# Patient Record
Sex: MALE | Race: WHITE | NOT HISPANIC OR LATINO | Employment: OTHER | URBAN - METROPOLITAN AREA
[De-identification: names, ages, dates, MRNs, and addresses within clinical notes are randomized per-mention and may not be internally consistent; named-entity substitution may affect disease eponyms.]

---

## 2017-01-06 ENCOUNTER — VIRTUAL VISIT (OUTPATIENT)
Dept: FAMILY MEDICINE | Facility: CLINIC | Age: 52
End: 2017-01-06
Payer: COMMERCIAL

## 2017-01-06 DIAGNOSIS — F51.05 INSOMNIA DUE TO OTHER MENTAL DISORDER: ICD-10-CM

## 2017-01-06 DIAGNOSIS — F99 INSOMNIA DUE TO OTHER MENTAL DISORDER: ICD-10-CM

## 2017-01-06 DIAGNOSIS — E34.9 HYPOTESTOSTERONISM: ICD-10-CM

## 2017-01-06 DIAGNOSIS — F33.2 SEVERE EPISODE OF RECURRENT MAJOR DEPRESSIVE DISORDER, WITHOUT PSYCHOTIC FEATURES (H): Primary | ICD-10-CM

## 2017-01-06 DIAGNOSIS — E55.9 HYPOVITAMINOSIS D: ICD-10-CM

## 2017-01-06 PROCEDURE — 99442 ZZC PHYSICIAN TELEPHONE EVALUATION 11-20 MIN: CPT | Performed by: FAMILY MEDICINE

## 2017-01-06 RX ORDER — TRAZODONE HYDROCHLORIDE 50 MG/1
50 TABLET, FILM COATED ORAL
Qty: 30 TABLET | Refills: 1 | Status: SHIPPED | OUTPATIENT
Start: 2017-01-06 | End: 2017-03-10

## 2017-01-06 RX ORDER — BUPROPION HYDROCHLORIDE 150 MG/1
150 TABLET ORAL EVERY MORNING
Qty: 90 TABLET | Refills: 3 | Status: SHIPPED | OUTPATIENT
Start: 2017-01-06 | End: 2017-02-10

## 2017-01-06 RX ORDER — CITALOPRAM HYDROBROMIDE 20 MG/1
20 TABLET ORAL 2 TIMES DAILY
Qty: 60 TABLET | Refills: 3 | Status: SHIPPED | OUTPATIENT
Start: 2017-01-06 | End: 2017-02-10

## 2017-01-06 NOTE — PROGRESS NOTES
Additional history/life update:       Severe episode of recurrent major depressive disorder, without psychotic features (H)  Insomnia due to other mental disorder  Hypotestosteronism  Hypovitaminosis D :    Mood slightly better, not worse.  Severe but not suicidal. titrating wellbutrin up slowly, had to start at 1/4 tab due to insomnia with it.  Up to one full tab now for only three days no side effects now.  Wondering about having trazodone refilled though in case gets insomnia.     Wondering about vitamin D dose.  Letter confusing.     Didn't get testosterone checked, took last dose 12/20/16.  Needs citalopram refilled as well.     Medical, social, surgical, and family histories reviewed.      REVIEW OF SYSTEMS FOR GENERAL, RESPIRATORY, CV, GI AND PSYCHIATRIC NEGATIVE EXCEPT AS NOTED IN HPI.         OBJECTIVE:  There were no vitals taken for this visit.    EXAM:  GENERAL APPEARANCE: healthy, alert and no distress      ASSESSMENT AND PLAN  1. Severe episode of recurrent major depressive disorder, without psychotic features (H)  Continue 150mg wellbutrin xl and citalopram 20 twice daily.  Likely will not increase dose further.     I suspect testosterone will be low and we will be increasing that dose which should help mood and energy.       2. Insomnia due to other mental disorder  Refilled trazodone to use as needed.   - traZODone (DESYREL) 50 MG tablet; Take 1 tablet (50 mg) by mouth nightly as needed for sleep  Dispense: 30 tablet; Refill: 1    3. Hypotestosteronism  As above.  Check level on 1/9/17.   - Testosterone Free and Total; Future    4. Hypovitaminosis D  Will use 3, 000 units daily.   - cholecalciferol (VITAMIN D) 1000 UNIT tablet; Take 3 tablets (3,000 Units) by mouth daily Re-check level April 2017  Dispense: 90 tablet; Refill: 1      Started phone visit at 11:16 and ended at 11:35.  Total time 19 minutes.     Joel Wegener,MD

## 2017-01-09 ENCOUNTER — TELEPHONE (OUTPATIENT)
Dept: FAMILY MEDICINE | Facility: CLINIC | Age: 52
End: 2017-01-09

## 2017-01-09 DIAGNOSIS — E34.9 HYPOTESTOSTERONISM: ICD-10-CM

## 2017-01-09 PROCEDURE — 36415 COLL VENOUS BLD VENIPUNCTURE: CPT | Performed by: FAMILY MEDICINE

## 2017-01-09 PROCEDURE — 84403 ASSAY OF TOTAL TESTOSTERONE: CPT | Mod: 90 | Performed by: FAMILY MEDICINE

## 2017-01-09 PROCEDURE — 99000 SPECIMEN HANDLING OFFICE-LAB: CPT | Performed by: FAMILY MEDICINE

## 2017-01-09 PROCEDURE — 84270 ASSAY OF SEX HORMONE GLOBUL: CPT | Mod: 90 | Performed by: FAMILY MEDICINE

## 2017-01-11 LAB
SHBG SERPL-SCNC: 34 NMOL/L (ref 11–80)
TESTOST FREE SERPL-MCNC: 3.16 NG/DL (ref 4.7–24.4)
TESTOST SERPL-MCNC: 171 NG/DL (ref 240–950)

## 2017-01-12 ENCOUNTER — TELEPHONE (OUTPATIENT)
Dept: FAMILY MEDICINE | Facility: CLINIC | Age: 52
End: 2017-01-12

## 2017-01-12 DIAGNOSIS — E34.9 HYPOTESTOSTERONISM: Primary | ICD-10-CM

## 2017-01-12 NOTE — TELEPHONE ENCOUNTER
Luis,     I'll have my team call you tomorrow about adjusting your testosterone dose since it is still quite low.  I think this will help a lot!     Joel Wegener,MD

## 2017-01-13 RX ORDER — TESTOSTERONE CYPIONATE 200 MG/ML
200 INJECTION, SOLUTION INTRAMUSCULAR
Qty: 3 ML | Refills: 5 | Status: SHIPPED | OUTPATIENT
Start: 2017-01-13 | End: 2017-08-01

## 2017-01-13 NOTE — TELEPHONE ENCOUNTER
Please call pt.  Testosterone level fairly low.      REcommend:    Increase frequency of dose to every 14 days (from every two weeks) = 200mg every two weeks.     Re-check testosterone level 1/2 way between doses in about six weeks - I put in future lab order.     Please bring new prescription in nurse basket to  for him to  and bring to his pharmacy.     Joel Wegener,MD

## 2017-02-06 ENCOUNTER — TELEPHONE (OUTPATIENT)
Dept: FAMILY MEDICINE | Facility: CLINIC | Age: 52
End: 2017-02-06

## 2017-02-06 NOTE — TELEPHONE ENCOUNTER
Reason for Call:  Other call back    Detailed comments: Patients' wife Maricruz called in regards to two medications that her spouse is taking. Didn't mention which ones but states that one of the medications keeps him up at night so she assumed that was the traZODone; the other medication changes him - i.e his sexual behavior and she was wondering if there were any alternatives that he could take compared to what he is already taking.    Phone Number Patient can be reached at: Home number on file [phone] (home)    Best Time: anytime    Can we leave a detailed message on this number? YES    Call taken on 2/6/2017 at 1:18 PM by Hayden Johnson

## 2017-02-07 NOTE — TELEPHONE ENCOUNTER
I don't believe there is any limit on visits for insurance, it wouldn't be covered as a preventive physical however.      Yes, telephone visit is fine.  Schedule in my schedule in green slot or if they feel is urgent same day hold is ok too.     Joel Wegener,MD

## 2017-02-07 NOTE — TELEPHONE ENCOUNTER
Called and spoke with pt wife, regarding pt coming in for a office visit to follow up and discuss meds per Dr. Wegener. Pt wife would like to know if they can schedule a phone visit instead of an OV as she doesn't think insurance will cover another visit.    Lizette Banks MA

## 2017-02-07 NOTE — TELEPHONE ENCOUNTER
"Spoke to wife with patiet in the background answering questons as well:  Patient started Bupropion but it is keeping him up at night even though it says it can cause drowsiness.  Citalopram gives him \"real bad personal, sexual problems\"    Not sure why he is on both meds at the same time  Had tried citalopram in the past and had sexual problems then as well.  Please advise.  Jenny Gamboa RN  "

## 2017-02-07 NOTE — TELEPHONE ENCOUNTER
Pt spouse calling to request a return call asap, as they have other medications to  at the pharmacy today.    Annita Thomas  Patient Representative

## 2017-02-08 NOTE — TELEPHONE ENCOUNTER
Received return call from Maricruz - wife - consent to communicate on file - discussed provider response    Verbalized understanding - no further questions at this time    Closing encounter - no further actions needed at this time    Jay Sanchez RN

## 2017-02-08 NOTE — TELEPHONE ENCOUNTER
Called and left message for patient to call back. Regarding the note from Wegener.   Thanks.    Loreta Garcia MA

## 2017-02-08 NOTE — TELEPHONE ENCOUNTER
Dr Wegener,     OK to just stop Bupropion if he is out or should he get refill until his conversation with you Friday    Liane Shearer, RN, BSN

## 2017-02-08 NOTE — TELEPHONE ENCOUNTER
I called patient and schedule telephone visit for Friday however, patient wife is asking if he should refill the Bupropion?    Please advise

## 2017-02-10 ENCOUNTER — VIRTUAL VISIT (OUTPATIENT)
Dept: FAMILY MEDICINE | Facility: CLINIC | Age: 52
End: 2017-02-10
Payer: COMMERCIAL

## 2017-02-10 DIAGNOSIS — F33.0 MAJOR DEPRESSIVE DISORDER, RECURRENT EPISODE, MILD (H): Primary | ICD-10-CM

## 2017-02-10 DIAGNOSIS — E34.9 HYPOTESTOSTERONISM: ICD-10-CM

## 2017-02-10 PROCEDURE — 99442 ZZC PHYSICIAN TELEPHONE EVALUATION 11-20 MIN: CPT | Performed by: FAMILY MEDICINE

## 2017-02-10 RX ORDER — ESCITALOPRAM OXALATE 20 MG/1
20 TABLET ORAL DAILY
Qty: 90 TABLET | Refills: 1 | Status: SHIPPED | OUTPATIENT
Start: 2017-02-10 | End: 2017-07-31

## 2017-02-10 ASSESSMENT — ANXIETY QUESTIONNAIRES
IF YOU CHECKED OFF ANY PROBLEMS ON THIS QUESTIONNAIRE, HOW DIFFICULT HAVE THESE PROBLEMS MADE IT FOR YOU TO DO YOUR WORK, TAKE CARE OF THINGS AT HOME, OR GET ALONG WITH OTHER PEOPLE: SOMEWHAT DIFFICULT
5. BEING SO RESTLESS THAT IT IS HARD TO SIT STILL: NOT AT ALL
2. NOT BEING ABLE TO STOP OR CONTROL WORRYING: NOT AT ALL
1. FEELING NERVOUS, ANXIOUS, OR ON EDGE: NOT AT ALL
6. BECOMING EASILY ANNOYED OR IRRITABLE: SEVERAL DAYS
3. WORRYING TOO MUCH ABOUT DIFFERENT THINGS: NOT AT ALL

## 2017-02-10 ASSESSMENT — PATIENT HEALTH QUESTIONNAIRE - PHQ9: 5. POOR APPETITE OR OVEREATING: NOT AT ALL

## 2017-02-10 NOTE — PROGRESS NOTES
"Luis Diehl is a 51 year old male who is being evaluated via a telephone visit.      The patient has been notified of following:     \"This telephone visit will be conducted via a call between you and your physician/provider. We have found that certain health care needs can be provided without the need for a physical exam.  This service lets us provide the care you need with a short phone conversation.  If a prescription is necessary we can send it directly to your pharmacy.  If lab work is needed we can place an order for that and you can then stop by our lab to have the test done at a later time.    We will bill your insurance company for this service.  Please check with your medical insurance if this type of visit is covered. You may be responsible for the cost of this type of visit if insurance coverage is denied.  The typical cost is $30 (10min), $59 (11-20min) and $85 (21-30min).  Most often these visits are shorter than 10 minutes.    If during the course of the call the physician/provider feels a telephone visit is not appropriate, you will not be charged for this service.\"       Consent has been obtained for this service by 2 care team members: yes. See the scanned image in the medical record.    Luis Diehl complains of  Telephone      I have reviewed and updated the patient's Past Medical History, Social History, Family History and Medication List.    ALLERGIES  Review of patient's allergies indicates no known allergies.    Loreta Garcia MA   (MA signature)    Additional history/life update:       Major depressive disorder, recurrent episode, mild (H)  Hypotestosteronism :does feel mood slightly/slowly improving.   No longer hopless.  Has more motivation.      Medications not going great though.  Still significant ED which he attributes to the celexa.  Same problem with effexor in past.  Perhaps improved some with testosterone replacement.      wellbutrin he attributes insomnia and " sometimes lack of appetite to that so really wants to stop it.     Medical, social, surgical, and family histories reviewed.      REVIEW OF SYSTEMS FOR GENERAL, RESPIRATORY, CV, GI AND PSYCHIATRIC NEGATIVE EXCEPT AS NOTED IN HPI.         OBJECTIVE:  There were no vitals taken for this visit.    EXAM:  GENERAL APPEARANCE: healthy, alert and no distress      ASSESSMENT AND PLAN  1. Major depressive disorder, recurrent episode, mild (H)  Stop wellbutrin.     Stop celexa.      Start lexapro as below.       - escitalopram (LEXAPRO) 20 MG tablet; Take 1 tablet (20 mg) by mouth daily  Dispense: 90 tablet; Refill: 1    2. Hypotestosteronism  Check lab 1/2 way between injections in mid march and we can adjust dose as needed.     I recommended follow up in one month and in 5 months.  He wants to wait to schedule due to cost.      Definitely let me know if not continuing to improve.       Started phone visit at 12:01, ended at 12:15        Total time of call between patient and provider was 14 minutes     Joel Wegener,MD

## 2017-02-11 ASSESSMENT — PATIENT HEALTH QUESTIONNAIRE - PHQ9: SUM OF ALL RESPONSES TO PHQ QUESTIONS 1-9: 5

## 2017-02-14 DIAGNOSIS — E78.5 HYPERLIPIDEMIA LDL GOAL <130: Primary | ICD-10-CM

## 2017-02-14 NOTE — TELEPHONE ENCOUNTER
Received refill request from [facility].   Pt want refills for atorvastatin (LIPITOR) 40 MG tablet.      Rosario Ochoa MA

## 2017-02-14 NOTE — TELEPHONE ENCOUNTER
Sarah with Dr. Wegener and he would like Luis to call his insurance to see what other statins are covered by his insurance.   Called pt and left vm to call back .     Please let pt know to call his insurance and find out what statins are covered under his insurance when he calls back    Thanks!    Barbara West, CMA

## 2017-02-15 DIAGNOSIS — E78.5 HYPERLIPIDEMIA LDL GOAL <130: ICD-10-CM

## 2017-02-15 RX ORDER — ATORVASTATIN CALCIUM 40 MG/1
40 TABLET, FILM COATED ORAL DAILY
Qty: 90 TABLET | Refills: 1 | Status: CANCELLED | OUTPATIENT
Start: 2017-02-15

## 2017-02-15 RX ORDER — ATORVASTATIN CALCIUM 40 MG/1
40 TABLET, FILM COATED ORAL DAILY
Qty: 90 TABLET | Refills: 2 | Status: SHIPPED | OUTPATIENT
Start: 2017-02-15 | End: 2017-11-01

## 2017-02-15 NOTE — TELEPHONE ENCOUNTER
Patient's wife, Maricruz, called and spoke with writer.    Consent to communicate on file.    Per Maricruz:  1. Patient's insurance does cover Atorvastatin 40 mg, please send refill today    Routing to PCP.    Dr. Wegener-Please sign if agree.  Writer unsure if there was a prior auth request.  Writer does not find one in patient's chart.    Thank you!  YOLANDA Huber, RN           Lab Results   Component Value Date    CHOL 150 12/13/2016     Lab Results   Component Value Date    HDL 41 12/13/2016     Lab Results   Component Value Date    LDL 73 12/13/2016     Lab Results   Component Value Date    TRIG 182 12/13/2016     No results found for: CHOLHDLRATIO

## 2017-02-15 NOTE — TELEPHONE ENCOUNTER
Maricruz (wife) is calling from pharmacy.  She is saying that a nurse here told her that this order would be sent in right away and so she is waiting at the pharmacy.  I sent a lync to Dr. Wegener asking him to take a look at this as soon as he can.  Then I reviewed and it looks like RN can authorize.  I called Maricruz and told her order sent.    Prescription approved per [facility] Refill Protocol.  Jeanna Bazan RN      Last office visit : 12/13/16     Lab Results   Component Value Date    CHOL 150 12/13/2016     Lab Results   Component Value Date    HDL 41 12/13/2016     Lab Results   Component Value Date    LDL 73 12/13/2016     Lab Results   Component Value Date    TRIG 182 12/13/2016     No results found for: CHOLHDLRATIO

## 2017-02-15 NOTE — TELEPHONE ENCOUNTER
atorvastatin (LIPITOR) 40 MG tablet     Last Written Prescription Date: 7/31/16  Last Fill Quantity: 90, # refills: 1  Last Office Visit with [facility], [facility] or [facility] prescribing provider: 12/13/16       Lab Results   Component Value Date    CHOL 150 12/13/2016     Lab Results   Component Value Date    HDL 41 12/13/2016     Lab Results   Component Value Date    LDL 73 12/13/2016     Lab Results   Component Value Date    TRIG 182 12/13/2016     No results found for: CHOLHDLRATIO

## 2017-03-10 DIAGNOSIS — F51.05 INSOMNIA DUE TO OTHER MENTAL DISORDER: ICD-10-CM

## 2017-03-10 DIAGNOSIS — F99 INSOMNIA DUE TO OTHER MENTAL DISORDER: ICD-10-CM

## 2017-03-10 RX ORDER — TRAZODONE HYDROCHLORIDE 50 MG/1
TABLET, FILM COATED ORAL
Qty: 30 TABLET | Refills: 0 | Status: SHIPPED | OUTPATIENT
Start: 2017-03-10 | End: 2017-04-03

## 2017-04-03 DIAGNOSIS — F51.05 INSOMNIA DUE TO OTHER MENTAL DISORDER: ICD-10-CM

## 2017-04-03 DIAGNOSIS — F99 INSOMNIA DUE TO OTHER MENTAL DISORDER: ICD-10-CM

## 2017-04-04 NOTE — TELEPHONE ENCOUNTER
Medication Detail      Disp Refills Start End SHILPA   traZODone (DESYREL) 50 MG tablet 30 tablet 0 3/10/2017  No   Sig: TAKE ONE TABLET BY MOUTH ONCE DAILY AT  NIGHT  AS  NEEDED  FOR  SLEEP       Last Office Visit with [facility], [facility] or [facility] prescribing provider:  12/13/16        Last PHQ-9 score on record=   PHQ-9 SCORE 2/10/2017   Total Score 5       Lab Results   Component Value Date    AST 12 12/13/2016     Lab Results   Component Value Date    ALT 30 12/13/2016

## 2017-04-05 RX ORDER — TRAZODONE HYDROCHLORIDE 50 MG/1
TABLET, FILM COATED ORAL
Qty: 90 TABLET | Refills: 1 | Status: SHIPPED | OUTPATIENT
Start: 2017-04-05 | End: 2017-10-02

## 2017-04-06 NOTE — TELEPHONE ENCOUNTER
Prescription approved per [facility] Refill Protocol.    Signed Prescriptions:                        Disp   Refills    traZODone (DESYREL) 50 MG tablet           90 tab*1        Sig: TAKE ONE TABLET BY MOUTH ONCE DAILY AT NIGHT AS           NEEDED FOR SLEEP  Authorizing Provider: WEGENER, JOEL DANIEL IRWIN  Ordering User: LEIGH ANN BUCKLEY      Closing encounter - no further actions needed at this time    Leigh Ann Buckley RN

## 2017-04-26 ENCOUNTER — TELEPHONE (OUTPATIENT)
Dept: FAMILY MEDICINE | Facility: CLINIC | Age: 52
End: 2017-04-26

## 2017-04-26 NOTE — TELEPHONE ENCOUNTER
I have attempted to contact this patient by phone and left message to return my call on answering machine.    Please let Luis Martha Diehl know that his/ her provider would like to see how they are doing and would like to complete a PHQ9 over the phone.     Trang Ochoa MA

## 2017-04-26 NOTE — LETTER
[facility]   [address] [place] [address]   [id number]  [phone]    May 12, 2017      Luis Thomas Fazal  [address] [place] [address]              Dear Mr. Diehl,      I hope you are doing well. Your provider wanted me to check in with you to see how you are doing with depression. Please fill out the attached questionnaire(s) and send it back to us within 3-4 days so we can assess the status of your health.     If you do not wish to do the questionnaire, please give us a called we can do this over the phone or schedule a visit with your provider. Our phone number is [phone].         Sincerely,      Your [facility] Care Team

## 2017-05-23 DIAGNOSIS — E55.9 HYPOVITAMINOSIS D: ICD-10-CM

## 2017-05-23 DIAGNOSIS — N20.0 NEPHROLITHIASIS: ICD-10-CM

## 2017-05-23 NOTE — TELEPHONE ENCOUNTER
Medication Detail      Disp Refills Start End SHILPA   chlorthalidone (HYGROTON) 25 MG tablet 45 tablet 3 5/23/2016  --   Sig: Take 0.5 tablets (12.5 mg) by mouth daily       Last Office Visit with [facility], [facility] or [facility] prescribing provider: 12/13/16       Potassium   Date Value Ref Range Status   12/13/2016 3.8 3.4 - 5.3 mmol/L Final     Creatinine   Date Value Ref Range Status   12/13/2016 0.92 0.66 - 1.25 mg/dL Final     BP Readings from Last 3 Encounters:   12/13/16 136/66   01/26/16 130/62     Medication Detail      Disp Refills Start End SHILPA   cholecalciferol (VITAMIN D) 1000 UNIT tablet 90 tablet 1 1/6/2017  --   Sig: Take 3 tablets (3,000 Units) by mouth daily Re-check level April 2017        Last Office Visit with [facility], [facility] or [facility] prescribing provider: 12/13/16

## 2017-05-24 RX ORDER — CHLORTHALIDONE 25 MG/1
12.5 TABLET ORAL DAILY
Qty: 45 TABLET | Refills: 1 | Status: SHIPPED | OUTPATIENT
Start: 2017-05-24 | End: 2017-12-07

## 2017-05-24 NOTE — TELEPHONE ENCOUNTER
"Routing refill request to provider for review/approval because:  --Plan in last order for Vit D 1/6/17 was \"Re-check level April 2017\".    Component      Latest Ref Rng & Units 12/13/2016   Vitamin D Deficiency screening      20 - 75 ug/L 17 (L)       --  --Please call patient  and ask him to schedule a lab only for vit D check.  A refill request for below medication  was sent to the provider.     Thank you,  Jeanna Bazan RN        "

## 2017-05-25 NOTE — TELEPHONE ENCOUNTER
Lm on vm letting patient know provider wants him to have labs done for further refills of his Vit D meds

## 2017-05-26 ASSESSMENT — ANXIETY QUESTIONNAIRES
IF YOU CHECKED OFF ANY PROBLEMS ON THIS QUESTIONNAIRE, HOW DIFFICULT HAVE THESE PROBLEMS MADE IT FOR YOU TO DO YOUR WORK, TAKE CARE OF THINGS AT HOME, OR GET ALONG WITH OTHER PEOPLE: SOMEWHAT DIFFICULT
2. NOT BEING ABLE TO STOP OR CONTROL WORRYING: SEVERAL DAYS
7. FEELING AFRAID AS IF SOMETHING AWFUL MIGHT HAPPEN: SEVERAL DAYS
6. BECOMING EASILY ANNOYED OR IRRITABLE: SEVERAL DAYS
GAD7 TOTAL SCORE: 6
5. BEING SO RESTLESS THAT IT IS HARD TO SIT STILL: NOT AT ALL
3. WORRYING TOO MUCH ABOUT DIFFERENT THINGS: SEVERAL DAYS
1. FEELING NERVOUS, ANXIOUS, OR ON EDGE: SEVERAL DAYS

## 2017-05-26 ASSESSMENT — PATIENT HEALTH QUESTIONNAIRE - PHQ9: 5. POOR APPETITE OR OVEREATING: SEVERAL DAYS

## 2017-05-27 ASSESSMENT — ANXIETY QUESTIONNAIRES: GAD7 TOTAL SCORE: 6

## 2017-05-27 ASSESSMENT — PATIENT HEALTH QUESTIONNAIRE - PHQ9: SUM OF ALL RESPONSES TO PHQ QUESTIONS 1-9: 3

## 2017-06-28 NOTE — TELEPHONE ENCOUNTER
Patient has lab appt on Thursday 6/29, no orders in chart. Please place order for test below that patient was told to come in for.    Thanks,   lab

## 2017-06-29 DIAGNOSIS — E34.9 HYPOTESTOSTERONISM: Primary | ICD-10-CM

## 2017-06-29 DIAGNOSIS — E55.9 HYPOVITAMINOSIS D: ICD-10-CM

## 2017-06-29 PROCEDURE — 84403 ASSAY OF TOTAL TESTOSTERONE: CPT | Performed by: FAMILY MEDICINE

## 2017-06-29 PROCEDURE — 36415 COLL VENOUS BLD VENIPUNCTURE: CPT | Performed by: FAMILY MEDICINE

## 2017-06-29 PROCEDURE — 84270 ASSAY OF SEX HORMONE GLOBUL: CPT | Performed by: FAMILY MEDICINE

## 2017-06-29 PROCEDURE — 82306 VITAMIN D 25 HYDROXY: CPT | Performed by: FAMILY MEDICINE

## 2017-06-29 NOTE — LETTER
[facility]   [address] [place]   [address]  [phone]    July 6, 2017      Luis Diehl  [address] [place] [address]              Dear Mr. Diehl,    Your testosterone level came back normal indicating you are on the correct dose of testosterone therapy.      Results for orders placed or performed in visit on 06/29/17   Vitamin D Deficiency   Result Value Ref Range    Vitamin D Deficiency screening 53 20 - 75 ug/L   **Testosterone Free and Total FUTURE anytime   Result Value Ref Range    Testosterone Total 325 240 - 950 ng/dL    Sex Hormone Binding Globulin 29 11 - 80 nmol/L    Free Testosterone Calculated 6.94 4.7 - 24.4 ng/dL         Sincerely,    Pam Sow, YVON/nr  (covering for Dr. Wegener who is currently out of the office)

## 2017-06-29 NOTE — LETTER
[facility]   [address] [place]   [address]  [phone]    July 5, 2017      Luis Thomas Fazal  [address] [place] [address]              Dear Mr. Diehl,    Your vitamin D test was normal.    Results for orders placed or performed in visit on 06/29/17   Vitamin D Deficiency   Result Value Ref Range    Vitamin D Deficiency screening 53 20 - 75 ug/L   **Testosterone Free and Total FUTURE anytime   Result Value Ref Range    Testosterone Total Pending 240 - 950 ng/dL    Sex Hormone Binding Globulin 29 11 - 80 nmol/L    Free Testosterone Calculated Pending 4.7 - 24.4 ng/dL           Sincerely,    Nisa Diaz MD/nr

## 2017-06-30 LAB — DEPRECATED CALCIDIOL+CALCIFEROL SERPL-MC: 53 UG/L (ref 20–75)

## 2017-07-06 LAB
SHBG SERPL-SCNC: 29 NMOL/L (ref 11–80)
TESTOST FREE SERPL-MCNC: 6.94 NG/DL (ref 4.7–24.4)
TESTOST SERPL-MCNC: 325 NG/DL (ref 240–950)

## 2017-07-06 NOTE — PROGRESS NOTES
Please send out result letter with the following note, thanks.    Luis,    Your testosterone level came back normal indicating you are on the correct dose of testosterone therapy.    -Pam Renee CNP (covering for Dr. Wegener who is currently out of the office)

## 2017-07-31 DIAGNOSIS — F33.0 MAJOR DEPRESSIVE DISORDER, RECURRENT EPISODE, MILD (H): ICD-10-CM

## 2017-07-31 NOTE — TELEPHONE ENCOUNTER
Reason for Call:  Medication or medication refill:    Do you use a [facility]?  Name of the pharmacy and phone number for the current request:  [facility] [address] [facility] [address]    Name of the medication requested: Lexapro    Other request: Pt wife came in for appt toay, 7/31, and asked that this medication be refilled for pt. Consent to communicate on file.    Can we leave a detailed message on this number? YES    Phone number patient can be reached at: [phone]    Best Time: Anytime    Call taken on 7/31/2017 at 11:22 AM by Annita Thomas

## 2017-08-01 DIAGNOSIS — E34.9 HYPOTESTOSTERONISM: ICD-10-CM

## 2017-08-01 DIAGNOSIS — F33.0 MAJOR DEPRESSIVE DISORDER, RECURRENT EPISODE, MILD (H): ICD-10-CM

## 2017-08-02 RX ORDER — ESCITALOPRAM OXALATE 20 MG/1
20 TABLET ORAL DAILY
Qty: 90 TABLET | Refills: 1 | Status: SHIPPED | OUTPATIENT
Start: 2017-08-02 | End: 2017-12-15

## 2017-08-02 NOTE — TELEPHONE ENCOUNTER
escitalopram (LEXAPRO) 20 MG tablet     Last Written Prescription Date: 2/10/2017  Last Fill Quantity: 90, # refills: 1  Last Office Visit with [facility] primary care provider:  12/13/2016        Last PHQ-9 score on record=   PHQ-9 SCORE 5/26/2017   Total Score 3       Prescription approved per [facility] Refill Protocol.    Signed Prescriptions:                        Disp   Refills    escitalopram (LEXAPRO) 20 MG tablet        90 tab*1        Sig: Take 1 tablet (20 mg) by mouth daily  Authorizing Provider: WEGENER, JOEL DANIEL IRWIN  Ordering User: LEIGH ANN BUCKLEY      Closing encounter - no further actions needed at this time    Leigh Ann Buckley RN

## 2017-08-03 RX ORDER — ESCITALOPRAM OXALATE 20 MG/1
TABLET ORAL
Qty: 90 TABLET | Refills: 0 | OUTPATIENT
Start: 2017-08-03

## 2017-08-03 NOTE — TELEPHONE ENCOUNTER
Routing refill request to provider for review/approval because:  Drug not on the [facility] refill protocol-Testosterone    Lexapro refilled on 8/2/17, this is duplicate request. Lexapro refused.    Per [place] , last three fill dates for testosterone:  7/2/17 5/23/17 4/5/17    Dr. Wegener-Please sign if agree.    Thank you!  NELLA HuberN, RN      Controlled Substance Refill Request for testosterone cypionate (DEPOTESTOTERONE CYPIONATE) 200 MG/ML injection  Problem List Complete:  No     PROVIDER TO CONSIDER COMPLETION OF PROBLEM LIST AND OVERVIEW/CONTROLLED SUBSTANCE AGREEMENT    Last Written Prescription Date:  1/13/17  Last Fill Quantity: 3 mL,   # refills: 5    Last Office Visit with [facility] primary care provider: 12/13/16    Future Office visit:     Controlled substance agreement on file: Yes:  Date 12/13/16.     Processing:  Fax Rx to [facility] pharmacy

## 2017-08-04 RX ORDER — TESTOSTERONE CYPIONATE 200 MG/ML
200 INJECTION, SOLUTION INTRAMUSCULAR
Qty: 3 ML | Refills: 5 | Status: SHIPPED | OUTPATIENT
Start: 2017-08-04 | End: 2017-12-15

## 2017-08-04 NOTE — TELEPHONE ENCOUNTER
Prescription in nurse basket.  Please have pt make in-person follow up appointment in six months for full labs, exam, controlled substance visit.     Joel Wegener,MD

## 2017-08-07 NOTE — TELEPHONE ENCOUNTER
Faxed script to [facility] [place]  Rx for: testosteron cypionate 200mg/ML injection.  Start date: Aug 4 2017  Dispense Qty: 3 (three)  Refill: 5 (five)  Monie: No    Called and lvm for pt    Barbara West CMA

## 2017-09-22 DIAGNOSIS — N52.9 ERECTILE DYSFUNCTION, UNSPECIFIED ERECTILE DYSFUNCTION TYPE: ICD-10-CM

## 2017-09-22 RX ORDER — SILDENAFIL 50 MG/1
25-50 TABLET, FILM COATED ORAL DAILY PRN
Qty: 12 TABLET | Refills: 11 | Status: SHIPPED | OUTPATIENT
Start: 2017-09-22 | End: 2017-09-26

## 2017-09-22 NOTE — TELEPHONE ENCOUNTER
Routing refill request to provider for review/approval because:  A break in medication    Thank you,  Jay Sanchez RN

## 2017-09-22 NOTE — TELEPHONE ENCOUNTER
sildenafil (VIAGRA) 50 MG tablet      Last Written Prescription Date: 2/29/16  Last Fill Quantity: 12,  # refills: 11   Last Office Visit with [facility], [facility] or [facility] prescribing provider: 12/13/16

## 2017-09-26 DIAGNOSIS — N52.9 ERECTILE DYSFUNCTION, UNSPECIFIED ERECTILE DYSFUNCTION TYPE: Primary | ICD-10-CM

## 2017-09-26 RX ORDER — SILDENAFIL CITRATE 20 MG/1
TABLET ORAL
Qty: 20 TABLET | Refills: 3 | Status: SHIPPED | OUTPATIENT
Start: 2017-09-26 | End: 2017-12-15

## 2017-09-26 NOTE — TELEPHONE ENCOUNTER
Will you please fax us a rx for sildenafil 20mg, there is no generic for the 50mg viagra    Medication Detail      Disp Refills Start End SHILPA   sildenafil (VIAGRA) 50 MG tablet 12 tablet 11 9/22/2017  No   Sig: Take 0.5-1 tablets (25-50 mg) by mouth daily as needed Take 30 min to 4 hours before intercourse.  Never use with nitroglycerin, terazosin or doxazosin. Give generic if possible please.       Last Office Visit with [facility], [facility] or [facility] prescribing provider: 12/13/16

## 2017-09-26 NOTE — TELEPHONE ENCOUNTER
Routing refill request to provider for review/approval because:  Pharmacy advised sildenafil 50 mg (generic) is not available - at 50 mg only Viagra is available - request to change to 20 mg (only generic dose available per pharmacist)      Thank you,  Jay Sanchez RN

## 2017-10-02 DIAGNOSIS — F51.05 INSOMNIA DUE TO OTHER MENTAL DISORDER: ICD-10-CM

## 2017-10-02 DIAGNOSIS — F99 INSOMNIA DUE TO OTHER MENTAL DISORDER: ICD-10-CM

## 2017-10-02 NOTE — TELEPHONE ENCOUNTER
Medication Detail      Disp Refills Start End SHILPA   traZODone (DESYREL) 50 MG tablet 90 tablet 1 4/5/2017  No   Sig: TAKE ONE TABLET BY MOUTH ONCE DAILY AT NIGHT AS NEEDED FOR SLEEP       Last Office Visit with [facility], [facility] or [facility] prescribing provider:  12/13/16        Last PHQ-9 score on record=   PHQ-9 SCORE 5/26/2017   Total Score 3       Lab Results   Component Value Date    AST 12 12/13/2016     Lab Results   Component Value Date    ALT 30 12/13/2016

## 2017-10-04 RX ORDER — TRAZODONE HYDROCHLORIDE 50 MG/1
TABLET, FILM COATED ORAL
Qty: 90 TABLET | Refills: 0 | Status: SHIPPED | OUTPATIENT
Start: 2017-10-04 | End: 2017-12-15

## 2017-10-04 NOTE — TELEPHONE ENCOUNTER
Using for sleep.  Prescription approved per [facility] [place]ill [place].  Jeanna Bazan RN

## 2017-11-01 DIAGNOSIS — E78.5 HYPERLIPIDEMIA LDL GOAL <130: ICD-10-CM

## 2017-11-06 DIAGNOSIS — E78.5 HYPERLIPIDEMIA LDL GOAL <130: ICD-10-CM

## 2017-11-06 RX ORDER — ATORVASTATIN CALCIUM 40 MG/1
TABLET, FILM COATED ORAL
Qty: 30 TABLET | Refills: 0 | Status: SHIPPED | OUTPATIENT
Start: 2017-11-06

## 2017-11-06 NOTE — TELEPHONE ENCOUNTER
atorvastatin         Needs labs and yearly visit. Refilled for 30 days only.   Ordered labwork. .This noted on refill for pt reminder.  Liane Shearer RN      Lab Results   Component Value Date    CHOL 150 12/13/2016     Lab Results   Component Value Date    HDL 41 12/13/2016     Lab Results   Component Value Date    LDL 73 12/13/2016     Lab Results   Component Value Date    TRIG 182 12/13/2016     No results found for: CHOLHDLRATIO

## 2017-11-07 RX ORDER — ATORVASTATIN CALCIUM 40 MG/1
TABLET, FILM COATED ORAL
Qty: 0.1 TABLET | Refills: 0 | OUTPATIENT
Start: 2017-11-07

## 2017-11-08 NOTE — TELEPHONE ENCOUNTER
Duplicate see script sent 11/6/2017 atorvastatin (LIPITOR) 40 MG tablet 30 tablet// no refills due in for follow up

## 2017-12-07 DIAGNOSIS — N20.0 NEPHROLITHIASIS: ICD-10-CM

## 2017-12-07 NOTE — TELEPHONE ENCOUNTER
Medication Detail      Disp Refills Start End SHILPA   chlorthalidone (HYGROTON) 25 MG tablet 45 tablet 1 5/24/2017  No   Sig: Take 0.5 tablets (12.5 mg) by mouth daily     lov 12/13/16

## 2017-12-11 RX ORDER — CHLORTHALIDONE 25 MG/1
TABLET ORAL
Qty: 15 TABLET | Refills: 0 | Status: SHIPPED | OUTPATIENT
Start: 2017-12-11 | End: 2017-12-26

## 2017-12-11 NOTE — TELEPHONE ENCOUNTER
Patient called to check the status on this stating he is currently out     Patient is aware of 48-72 hour refill policy

## 2017-12-15 ENCOUNTER — OFFICE VISIT (OUTPATIENT)
Dept: FAMILY MEDICINE | Facility: CLINIC | Age: 52
End: 2017-12-15
Payer: COMMERCIAL

## 2017-12-15 VITALS
TEMPERATURE: 97.3 F | WEIGHT: 235 LBS | HEART RATE: 69 BPM | OXYGEN SATURATION: 96 % | HEIGHT: 73 IN | DIASTOLIC BLOOD PRESSURE: 71 MMHG | BODY MASS INDEX: 31.14 KG/M2 | SYSTOLIC BLOOD PRESSURE: 109 MMHG | RESPIRATION RATE: 14 BRPM

## 2017-12-15 DIAGNOSIS — E55.9 HYPOVITAMINOSIS D: ICD-10-CM

## 2017-12-15 DIAGNOSIS — E78.5 HYPERLIPIDEMIA LDL GOAL <130: ICD-10-CM

## 2017-12-15 DIAGNOSIS — F51.05 INSOMNIA DUE TO OTHER MENTAL DISORDER: ICD-10-CM

## 2017-12-15 DIAGNOSIS — F33.0 MAJOR DEPRESSIVE DISORDER, RECURRENT EPISODE, MILD (H): ICD-10-CM

## 2017-12-15 DIAGNOSIS — N52.9 ERECTILE DYSFUNCTION, UNSPECIFIED ERECTILE DYSFUNCTION TYPE: ICD-10-CM

## 2017-12-15 DIAGNOSIS — F99 INSOMNIA DUE TO OTHER MENTAL DISORDER: ICD-10-CM

## 2017-12-15 DIAGNOSIS — N20.0 NEPHROLITHIASIS: ICD-10-CM

## 2017-12-15 DIAGNOSIS — Z00.01 ENCOUNTER FOR GENERAL ADULT MEDICAL EXAMINATION WITH ABNORMAL FINDINGS: Primary | ICD-10-CM

## 2017-12-15 DIAGNOSIS — E34.9 HYPOTESTOSTERONISM: ICD-10-CM

## 2017-12-15 LAB
ERYTHROCYTE [DISTWIDTH] IN BLOOD BY AUTOMATED COUNT: 13 % (ref 10–15)
HCT VFR BLD AUTO: 48.8 % (ref 40–53)
HGB BLD-MCNC: 16.8 G/DL (ref 13.3–17.7)
MCH RBC QN AUTO: 33.2 PG (ref 26.5–33)
MCHC RBC AUTO-ENTMCNC: 34.4 G/DL (ref 31.5–36.5)
MCV RBC AUTO: 96 FL (ref 78–100)
PLATELET # BLD AUTO: 283 10E9/L (ref 150–450)
RBC # BLD AUTO: 5.06 10E12/L (ref 4.4–5.9)
WBC # BLD AUTO: 7.9 10E9/L (ref 4–11)

## 2017-12-15 PROCEDURE — 36415 COLL VENOUS BLD VENIPUNCTURE: CPT | Performed by: FAMILY MEDICINE

## 2017-12-15 PROCEDURE — 84403 ASSAY OF TOTAL TESTOSTERONE: CPT | Performed by: FAMILY MEDICINE

## 2017-12-15 PROCEDURE — 80053 COMPREHEN METABOLIC PANEL: CPT | Performed by: FAMILY MEDICINE

## 2017-12-15 PROCEDURE — 99207 C PAF COMPLETED  NO CHARGE: CPT | Performed by: FAMILY MEDICINE

## 2017-12-15 PROCEDURE — 84270 ASSAY OF SEX HORMONE GLOBUL: CPT | Performed by: FAMILY MEDICINE

## 2017-12-15 PROCEDURE — 82306 VITAMIN D 25 HYDROXY: CPT | Performed by: FAMILY MEDICINE

## 2017-12-15 PROCEDURE — 80061 LIPID PANEL: CPT | Performed by: FAMILY MEDICINE

## 2017-12-15 PROCEDURE — 85027 COMPLETE CBC AUTOMATED: CPT | Performed by: FAMILY MEDICINE

## 2017-12-15 PROCEDURE — 99396 PREV VISIT EST AGE 40-64: CPT | Performed by: FAMILY MEDICINE

## 2017-12-15 RX ORDER — CHLORTHALIDONE 25 MG/1
TABLET ORAL
Qty: 15 TABLET | Refills: 0 | Status: CANCELLED | OUTPATIENT
Start: 2017-12-15

## 2017-12-15 RX ORDER — TESTOSTERONE CYPIONATE 200 MG/ML
200 INJECTION, SOLUTION INTRAMUSCULAR
Qty: 3 ML | Refills: 5 | Status: SHIPPED | OUTPATIENT
Start: 2017-12-15

## 2017-12-15 RX ORDER — TESTOSTERONE CYPIONATE 200 MG/ML
200 INJECTION, SOLUTION INTRAMUSCULAR
Qty: 3 ML | Refills: 5 | Status: CANCELLED | OUTPATIENT
Start: 2017-12-15

## 2017-12-15 RX ORDER — ATORVASTATIN CALCIUM 40 MG/1
40 TABLET, FILM COATED ORAL DAILY
Qty: 30 TABLET | Refills: 0 | Status: CANCELLED | OUTPATIENT
Start: 2017-12-15

## 2017-12-15 NOTE — PATIENT INSTRUCTIONS
Refilled all medications as needed.     Follow up six months for testosterone refill with me.     May come 1-2 weeks prior for testosterone lab if desired.     ASSESSMENT AND PLAN  1. Encounter for general adult medical examination with abnormal findings  Declines flu shot today.         2. Nephrolithiasis  Refilled med.     3. Hyperlipidemia LDL goal <130    - Comprehensive metabolic panel  - Lipid panel reflex to direct LDL Fasting    4. Hypotestosteronism  Due for injection tomorrow.  On for many years.      Refill given.     Labs for med monitoring today.     Follow up six months for lab check and refill visit ideally 1/2 way between doses.       - testosterone cypionate (DEPOTESTOTERONE) 200 MG/ML injection; Inject 1 mL (200 mg) into the muscle every 14 days  Dispense: 3 mL; Refill: 5  - CBC with platelets  - Comprehensive metabolic panel  - Testosterone Free and Total    5. Hypovitaminosis D    - Vitamin D Deficiency        MYCHART SIGNUP FOR E-VISITS AND EASIER COMMUNICATION:  [email or web address]     Zipnosis:  [email or web address].  Sign up for e-visits for common illnesses!     RADIOLOGY:   [facility]:  [phone] to schedule any radiology tests at [facility]: [phone]    Mammograms/colonoscopies:  [phone]    CONSUMER PRICE LINE for estimates of test costs:  [phone]         Preventive Health Recommendations  Male Ages 50 - 64    Yearly exam:             See your health care provider every year in order to  o   Review health changes.   o   Discuss preventive care.    o   Review your medicines if your doctor has prescribed any.     Have a cholesterol test every 5 years, or more frequently if you are at risk for high cholesterol/heart disease.     Have a diabetes test (fasting glucose) every three years. If you are at risk for diabetes, you should have this test more often.     Have a colonoscopy at age 50, or have a yearly FIT test (stool test). These exams  will check for colon cancer.      Talk with your health care provider about whether or not a prostate cancer screening test (PSA) is right for you.    You should be tested each year for STDs (sexually transmitted diseases), if you re at risk.     Shots: Get a flu shot each year. Get a tetanus shot every 10 years.     Nutrition:    Eat at least 5 servings of fruits and vegetables daily.     Eat whole-grain bread, whole-wheat pasta and brown rice instead of white grains and rice.     Talk to your provider about Calcium and Vitamin D.     Lifestyle    Exercise for at least 150 minutes a week (30 minutes a day, 5 days a week). This will help you control your weight and prevent disease.     Limit alcohol to one drink per day.     No smoking.     Wear sunscreen to prevent skin cancer.     See your dentist every six months for an exam and cleaning.     See your eye doctor every 1 to 2 years.    Preventive Health Recommendations  Male Ages 50 - 64    Yearly exam:             See your health care provider every year in order to  o   Review health changes.   o   Discuss preventive care.    o   Review your medicines if your doctor has prescribed any.     Have a cholesterol test every 5 years, or more frequently if you are at risk for high cholesterol/heart disease.     Have a diabetes test (fasting glucose) every three years. If you are at risk for diabetes, you should have this test more often.     Have a colonoscopy at age 50, or have a yearly FIT test (stool test). These exams will check for colon cancer.      Talk with your health care provider about whether or not a prostate cancer screening test (PSA) is right for you.    You should be tested each year for STDs (sexually transmitted diseases), if you re at risk.     Shots: Get a flu shot each year. Get a tetanus shot every 10 years.     Nutrition:    Eat at least 5 servings of fruits and vegetables daily.     Eat whole-grain bread, whole-wheat pasta and brown rice  instead of white grains and rice.     Talk to your provider about Calcium and Vitamin D.     Lifestyle    Exercise for at least 150 minutes a week (30 minutes a day, 5 days a week). This will help you control your weight and prevent disease.     Limit alcohol to one drink per day.     No smoking.     Wear sunscreen to prevent skin cancer.     See your dentist every six months for an exam and cleaning.     See your eye doctor every 1 to 2 years.

## 2017-12-15 NOTE — LETTER
December 27, 2017      Luis Diehl  [address] [place] [address]        Dear ,    We are writing to inform you of your test results.    The 10-year ASCVD risk score (Drapernatanael THOMSON Jr, et al., 2013) is: 4.9%    Values used to calculate the score:      Age: 52 years      Sex: Male      Is Non- : No      Diabetic: No      Tobacco smoker: Yes      Systolic Blood Pressure: 109 mmHg      Is BP treated: No      HDL Cholesterol: 50 mg/dL      Total Cholesterol: 140 mg/dL  Your cholesterol was decent and the other labs were normal.     Resulted Orders   CBC with platelets   Result Value Ref Range    WBC 7.9 4.0 - 11.0 10e9/L    RBC Count 5.06 4.4 - 5.9 10e12/L    Hemoglobin 16.8 13.3 - 17.7 g/dL    Hematocrit 48.8 40.0 - 53.0 %    MCV 96 78 - 100 fl    MCH 33.2 (H) 26.5 - 33.0 pg    MCHC 34.4 31.5 - 36.5 g/dL    RDW 13.0 10.0 - 15.0 %    Platelet Count 283 150 - 450 10e9/L   Comprehensive metabolic panel   Result Value Ref Range    Sodium 137 133 - 144 mmol/L    Potassium 4.0 3.4 - 5.3 mmol/L    Chloride 103 94 - 109 mmol/L    Carbon Dioxide 25 20 - 32 mmol/L    Anion Gap 9 3 - 14 mmol/L    Glucose 88 70 - 99 mg/dL    Urea Nitrogen 9 7 - 30 mg/dL    Creatinine 1.01 0.66 - 1.25 mg/dL    GFR Estimate 77 >60 mL/min/1.7m2      Comment:      Non  GFR Calc    GFR Estimate If Black >90 >60 mL/min/1.7m2      Comment:       GFR Calc    Calcium 9.0 8.5 - 10.1 mg/dL    Bilirubin Total 0.7 0.2 - 1.3 mg/dL    Albumin 3.8 3.4 - 5.0 g/dL    Protein Total 7.3 6.8 - 8.8 g/dL    Alkaline Phosphatase 70 40 - 150 U/L    ALT 28 0 - 70 U/L    AST 12 0 - 45 U/L   Lipid panel reflex to direct LDL Fasting   Result Value Ref Range    Cholesterol 140 <200 mg/dL    Triglycerides 93 <150 mg/dL    HDL Cholesterol 50 >39 mg/dL    LDL Cholesterol Calculated 71 <100 mg/dL      Comment:      Desirable:       <100 mg/dl    Non HDL Cholesterol 90 <130 mg/dL   Testosterone Free  and Total   Result Value Ref Range    Testosterone Total 531 240 - 950 ng/dL      Comment:      This test was developed and its performance characteristics determined by the   [facility],  [facility]. It has   not been cleared or approved by the FDA. The laboratory is regulated under   CLIA as qualified to perform high-complexity testing. This test is used for   clinical purposes. It should not be regarded as investigational or for   research.      Sex Hormone Binding Globulin 31 11 - 80 nmol/L    Free Testosterone Calculated 11.66 4.7 - 24.4 ng/dL   Vitamin D Deficiency   Result Value Ref Range    Vitamin D Deficiency screening 48 20 - 75 ug/L      Comment:      Season, race, dietary intake, and treatment affect the concentration of   25-hydroxy-Vitamin D. Values may decrease during winter months and increase   during summer months. Values 20-29 ug/L may indicate Vitamin D insufficiency   and values <20 ug/L may indicate Vitamin D deficiency.  Vitamin D determination is routinely performed by an immunoassay specific for   25 hydroxyvitamin D3.  If an individual is on vitamin D2 (ergocalciferol)   supplementation, please specify 25 OH vitamin D2 and D3 level determination by   LCMSMS test [id number].         If you have any questions or concerns, please call the clinic at the number listed above.       Sincerely,        Joel Daniel Wegener, MD/nr

## 2017-12-15 NOTE — MR AVS SNAPSHOT
After Visit Summary   12/15/2017    Luis Diehl    MRN: [id number]           Patient Information     Date Of Birth          1965        Visit Information        Provider Department      12/15/2017 11:20 AM Wegener, Joel Daniel Irwin, MD [facility]        Today's Diagnoses     Nephrolithiasis        Hyperlipidemia LDL goal <130        Hypotestosteronism          Care Instructions    Refilled all medications as needed.     Follow up six months for testosterone refill with me.     May come 1-2 weeks prior for testosterone lab if desired.       Preventive Health Recommendations  Male Ages 50 - 64    Yearly exam:             See your health care provider every year in order to  o   Review health changes.   o   Discuss preventive care.    o   Review your medicines if your doctor has prescribed any.     Have a cholesterol test every 5 years, or more frequently if you are at risk for high cholesterol/heart disease.     Have a diabetes test (fasting glucose) every three years. If you are at risk for diabetes, you should have this test more often.     Have a colonoscopy at age 50, or have a yearly FIT test (stool test). These exams will check for colon cancer.      Talk with your health care provider about whether or not a prostate cancer screening test (PSA) is right for you.    You should be tested each year for STDs (sexually transmitted diseases), if you re at risk.     Shots: Get a flu shot each year. Get a tetanus shot every 10 years.     Nutrition:    Eat at least 5 servings of fruits and vegetables daily.     Eat whole-grain bread, whole-wheat pasta and brown rice instead of white grains and rice.     Talk to your provider about Calcium and Vitamin D.     Lifestyle    Exercise for at least 150 minutes a week (30 minutes a day, 5 days a week). This will help you control your weight and prevent disease.     Limit alcohol to one drink per day.     No smoking.     Wear  sunscreen to prevent skin cancer.     See your dentist every six months for an exam and cleaning.     See your eye doctor every 1 to 2 years.    Preventive Health Recommendations  Male Ages 50 - 64    Yearly exam:             See your health care provider every year in order to  o   Review health changes.   o   Discuss preventive care.    o   Review your medicines if your doctor has prescribed any.     Have a cholesterol test every 5 years, or more frequently if you are at risk for high cholesterol/heart disease.     Have a diabetes test (fasting glucose) every three years. If you are at risk for diabetes, you should have this test more often.     Have a colonoscopy at age 50, or have a yearly FIT test (stool test). These exams will check for colon cancer.      Talk with your health care provider about whether or not a prostate cancer screening test (PSA) is right for you.    You should be tested each year for STDs (sexually transmitted diseases), if you re at risk.     Shots: Get a flu shot each year. Get a tetanus shot every 10 years.     Nutrition:    Eat at least 5 servings of fruits and vegetables daily.     Eat whole-grain bread, whole-wheat pasta and brown rice instead of white grains and rice.     Talk to your provider about Calcium and Vitamin D.     Lifestyle    Exercise for at least 150 minutes a week (30 minutes a day, 5 days a week). This will help you control your weight and prevent disease.     Limit alcohol to one drink per day.     No smoking.     Wear sunscreen to prevent skin cancer.     See your dentist every six months for an exam and cleaning.     See your eye doctor every 1 to 2 years.            Follow-ups after your visit        Who to contact     If you have questions or need follow up information about today's clinic visit or your schedule please contact [facility] directly at [phone].  Normal or non-critical lab and imaging results will be communicated to you by  "MyChart, letter or phone within 4 business days after the clinic has received the results. If you do not hear from us within 7 days, please contact the clinic through [email or web address] or phone. If you have a critical or abnormal lab result, we will notify you by phone as soon as possible.  Submit refill requests through [employer] or call your pharmacy and they will forward the refill request to us. Please allow 3 business days for your refill to be completed.          Additional Information About Your Visit        [email or web address] Information     [employer] lets you send messages to your doctor, view your test results, renew your prescriptions, schedule appointments and more. To sign up, go to [email or web address] . Click on \"Log in\" on the left side of the screen, which will take you to the Welcome page. Then click on \"Sign up Now\" on the right side of the page.     You will be asked to enter the access code listed below, as well as some personal information. Please follow the directions to create your username and password.     Your access code is: [email or web address]  Expires: 3/15/2018 12:35 PM     Your access code will  in 90 days. If you need help or a new code, please call your [place] clinic or [phone].        Care EveryWhere ID     This is your Care EveryWhere ID. This could be used by other organizations to access your [place] medical records  [id number]        Your Vitals Were     Pulse Temperature Respirations Height Pulse Oximetry BMI (Body Mass Index)    69 97.3  F (36.3  C) (Tympanic) 14 6' 1\" (1.854 m) 96% 31 kg/m2       Blood Pressure from Last 3 Encounters:   12/15/17 109/71   16 136/66   16 130/62    Weight from Last 3 Encounters:   12/15/17 235 lb (106.6 kg)   16 243 lb (110.2 kg)   16 237 lb (107.5 kg)              Today, you had the following     No orders found for display         Today's Medication Changes          These changes are accurate as of: 12/15/17 12:35 PM.  If you have " any questions, ask your nurse or doctor.               These medicines have changed or have updated prescriptions.        Dose/Directions    testosterone cypionate 200 MG/ML injection   Commonly known as:  DEPOTESTOTERONE   This may have changed:  additional instructions   Used for:  Hypotestosteronism   Changed by:  Wegener, Joel Daniel Irwin, MD        Dose:  200 mg   Inject 1 mL (200 mg) into the muscle every 14 days   Quantity:  3 mL   Refills:  5            Where to get your medicines      Some of these will need a paper prescription and others can be bought over the counter.  Ask your nurse if you have questions.     Bring a paper prescription for each of these medications     testosterone cypionate 200 MG/ML injection                Primary Care Provider Office Phone # Fax #    Joel Daniel Irwin Wegener, [phone] [address] [place] [id number]        Equal Access to Services     [facility]: Hadii cris shelby hadasho Soomaali, waaxda luqadaha, qaybta kaalmada adeegyada, david merrill . So [facility] [phone].    ATENCIÓN: Si habla español, tiene a camargo disposición servicios gratuitos de asistencia lingüística. [facility] [phone].    We comply with applicable federal civil rights laws and [place] laws. We do not discriminate on the basis of race, color, national origin, age, disability, sex, sexual orientation, or gender identity.            Thank you!     Thank you for choosing [facility]  for your care. Our goal is always to provide you with excellent care. Hearing back from our patients is one way we can continue to improve our services. Please take a few minutes to complete the written survey that you may receive in the mail after your visit with us. Thank you!             Your Updated Medication List - Protect others around you: Learn how to safely use, store and throw away your medicines at www.disposemymeds.org.          This list is  accurate as of: 12/15/17 12:35 PM.  Always use your most recent med list.                   Brand Name Dispense Instructions for use Diagnosis    * ATORVASTATIN CALCIUM PO      Take 40 mg by mouth daily        * atorvastatin 40 MG tablet    LIPITOR    90 tablet    Take 1 tablet (40 mg) by mouth daily    Hyperlipidemia LDL goal <130       * atorvastatin 40 MG tablet    LIPITOR    30 tablet    TAKE ONE TABLET BY MOUTH ONCE DAILY    Hyperlipidemia LDL goal <130       chlorthalidone 25 MG tablet    HYGROTON    15 tablet    TAKE ONE-HALF TABLET BY MOUTH ONCE DAILY (12.5MG)    Nephrolithiasis       escitalopram 20 MG tablet    LEXAPRO    90 tablet    Take 1 tablet (20 mg) by mouth daily    Major depressive disorder, recurrent episode, mild (H)       GLUCOSAMINE SULFATE PO      Take 500 mg by mouth daily        OMEGA-3 FISH OIL PO      Take 1,200 mg by mouth daily        sildenafil 20 MG tablet    REVATIO    20 tablet    One to two tablet daily as needed for erectile dysfunction    Erectile dysfunction, unspecified erectile dysfunction type       testosterone cypionate 200 MG/ML injection    DEPOTESTOTERONE    3 mL    Inject 1 mL (200 mg) into the muscle every 14 days    Hypotestosteronism       traZODone 50 MG tablet    DESYREL    90 tablet    TAKE ONE TABLET BY MOUTH ONCE DAILY AT BEDTIME AS NEEDED FOR SLEEP    Insomnia due to other mental disorder       UNABLE TO FIND      MEDICATION NAME: levitra        vardenafil 20 MG tablet    LEVITRA    12 tablet    Take 0.5-1 tablets (10-20 mg) by mouth daily as needed for erectile dysfunction Never use with nitroglycerin, terazosin or doxazosin.    Erectile dysfunction, unspecified erectile dysfunction type       * VITAMIN D3 PO      Take 1,000 Units by mouth daily        * cholecalciferol 1000 UNIT tablet    vitamin D3    30 tablet    Take 3 tablets (3,000 Units) by mouth daily Re-check level April 2017    Hypovitaminosis D       * Notice:  This list has 5 medication(s) that are  the same as other medications prescribed for you. Read the directions carefully, and ask your doctor or other care provider to review them with you.

## 2017-12-15 NOTE — PROGRESS NOTES
SUBJECTIVE:   CC: Luis Diehl is an 52 year old male who presents for preventative health visit.     Physical   Annual:     Getting at least 3 servings of Calcium per day::  NO    Bi-annual eye exam::  Yes    Dental care twice a year::  NO    Sleep apnea or symptoms of sleep apnea::  Sleep apnea    Diet::  Regular (no restrictions)    Frequency of exercise::  None    Taking medications regularly::  Yes    Medication side effects::  None    Additional concerns today::  YES                    Today's PHQ-2 Score:   PHQ-2 ( 1999 [employer]) 12/15/2017   Q1: Little interest or pleasure in doing things 0   Q2: Feeling down, depressed or hopeless 0   PHQ-2 Score 0   Q1: Little interest or pleasure in doing things Not at all   Q2: Feeling down, depressed or hopeless Not at all   PHQ-2 Score 0       Abuse: Current or Past(Physical, Sexual or Emotional)- No  Do you feel safe in your environment - Yes    Social History   Substance Use Topics     Smoking status: Current Every Day Smoker     Packs/day: 1.00     Years: 34.00     Types: Cigarettes     Smokeless tobacco: Not on file     Alcohol use Yes      Comment: 16 per week     Alcohol Use 12/15/2017   If you drink alcohol, do you typically have greater than 3 drinks per day OR greater than 7 drinks per week?   Yes   AUDIT SCORE  6     AUDIT - Alcohol Use Disorders Identification Test - Reproduced from the World Health Organization Audit 2001 (Second Edition) 12/15/2017   1.  How often do you have a drink containing alcohol? 4 or more times a week   2.  How many drinks containing alcohol do you have on a typical day when you are drinking? 3 or 4   3.  How often do you have five or more drinks on one occasion? Less than monthly   4.  How often during the last year have you found that you were not able to stop drinking once you had started? Never   5.  How often during the last year have you failed to do what was normally expected of you because of drinking? Never   6.   How often during the last year have you needed a first drink in the morning to get yourself going after a heavy drinking session? Never   7.  How often during the last year have you had a feeling of guilt or remorse after drinking? Never   8.  How often during the last year have you been unable to remember what happened the night before because of your drinking? Never   9.  Have you or someone else been injured because of your drinking? No   10. Has a relative, friend, doctor or other health care worker been concerned about your drinking or suggested you cut down? No   TOTAL SCORE 6         Last PSA:   PSA   Date Value Ref Range Status   12/13/2016 1.83 0 - 4 ug/L Final     Comment:     Assay Method:  Chemiluminescence using Siemens Vista analyzer       Reviewed orders with patient. Reviewed health maintenance and updated orders accordingly - Yes  Labs reviewed in EPIC    Reviewed and updated as needed this visit by clinical staff  Tobacco  Meds  Med Hx  Surg Hx  Fam Hx  Soc Hx        Reviewed and updated as needed this visit by Provider        Past Medical History:   Diagnosis Date     Depressive disorder       Past Surgical History:   Procedure Laterality Date     THORACIC SURGERY      kidney stones four times       Review of Systems  C: NEGATIVE for fever, chills, change in weight  I: NEGATIVE for worrisome rashes, moles or lesions  E: NEGATIVE for vision changes or irritation  ENT: NEGATIVE for ear, mouth and throat problems  R: NEGATIVE for significant cough or SOB  CV: NEGATIVE for chest pain, palpitations or peripheral edema  GI: NEGATIVE for nausea, abdominal pain, heartburn, or change in bowel habits   male: negative for dysuria, hematuria, decreased urinary stream, erectile dysfunction, urethral discharge  M: NEGATIVE for significant arthralgias or myalgia  N: NEGATIVE for weakness, dizziness or paresthesias  P: NEGATIVE for changes in mood or affect    OBJECTIVE:   /71  Pulse 69  Temp 97.3  " F (36.3  C) (Tympanic)  Resp 14  Ht 6' 1\" (1.854 m)  Wt 235 lb (106.6 kg)  SpO2 96%  BMI 31 kg/m2    Physical Exam  GENERAL: healthy, alert and no distress  EYES: Eyes grossly normal to inspection, PERRL and conjunctivae and sclerae normal  HENT: ear canals and TM's normal, nose and mouth without ulcers or lesions  NECK: no adenopathy, no asymmetry, masses, or scars and thyroid normal to palpation  RESP: lungs clear to auscultation - no rales, rhonchi or wheezes  CV: regular rate and rhythm, normal S1 S2, no S3 or S4, no murmur, click or rub, no peripheral edema and peripheral pulses strong  ABDOMEN: soft, nontender, no hepatosplenomegaly, no masses and bowel sounds normal   (male): normal male genitalia without lesions or urethral discharge, no hernia  RECTAL: normal sphincter tone, no rectal masses, prostate normal size, smooth, nontender without nodules or masses  MS: no gross musculoskeletal defects noted, no edema  SKIN: no suspicious lesions or rashes  NEURO: Normal strength and tone, mentation intact and speech normal  PSYCH: mentation appears normal, affect normal/bright    ASSESSMENT/PLAN:   Refilled all medications as needed.     Follow up six months for testosterone refill with me.     May come 1-2 weeks prior for testosterone lab if desired.     ASSESSMENT AND PLAN  1. Encounter for general adult medical examination with abnormal findings  Declines flu shot today.         2. Nephrolithiasis  Refilled med.     3. Hyperlipidemia LDL goal <130    - Comprehensive metabolic panel  - Lipid panel reflex to direct LDL Fasting    4. Hypotestosteronism  Due for injection tomorrow.  On for many years.      Refill given.     Labs for med monitoring today.     Follow up six months for lab check and refill visit ideally 1/2 way between doses.       - testosterone cypionate (DEPOTESTOTERONE) 200 MG/ML injection; Inject 1 mL (200 mg) into the muscle every 14 days  Dispense: 3 mL; Refill: 5  - CBC with " "platelets  - Comprehensive metabolic panel  - Testosterone Free and Total    5. Hypovitaminosis D    - Vitamin D Deficiency      COUNSELING:   Reviewed preventive health counseling, as reflected in patient instructions       Regular exercise       Healthy diet/nutrition           reports that he has been smoking Cigarettes.  He has a 34.00 pack-year smoking history. He does not have any smokeless tobacco history on file.  Tobacco Cessation Action Plan: Information offered: Patient not interested at this time  Estimated body mass index is 31 kg/(m^2) as calculated from the following:    Height as of this encounter: 6' 1\" (1.854 m).    Weight as of this encounter: 235 lb (106.6 kg).   Weight management plan: Discussed healthy diet and exercise guidelines and patient will follow up in 12 months in clinic to re-evaluate.    Counseling Resources:  ATP IV Guidelines  Pooled Cohorts Equation Calculator  FRAX Risk Assessment  ICSI Preventive Guidelines  Dietary Guidelines for Americans, 2010  USDA's MyPlate  ASA Prophylaxis  Lung CA Screening    Joel Daniel Wegener, MD  [facility]  Answers for HPI/ROS submitted by the patient on 12/15/2017   PHQ-2 Score: 0    "

## 2017-12-16 LAB
ALBUMIN SERPL-MCNC: 3.8 G/DL (ref 3.4–5)
ALP SERPL-CCNC: 70 U/L (ref 40–150)
ALT SERPL W P-5'-P-CCNC: 28 U/L (ref 0–70)
ANION GAP SERPL CALCULATED.3IONS-SCNC: 9 MMOL/L (ref 3–14)
AST SERPL W P-5'-P-CCNC: 12 U/L (ref 0–45)
BILIRUB SERPL-MCNC: 0.7 MG/DL (ref 0.2–1.3)
BUN SERPL-MCNC: 9 MG/DL (ref 7–30)
CALCIUM SERPL-MCNC: 9 MG/DL (ref 8.5–10.1)
CHLORIDE SERPL-SCNC: 103 MMOL/L (ref 94–109)
CHOLEST SERPL-MCNC: 140 MG/DL
CO2 SERPL-SCNC: 25 MMOL/L (ref 20–32)
CREAT SERPL-MCNC: 1.01 MG/DL (ref 0.66–1.25)
GFR SERPL CREATININE-BSD FRML MDRD: 77 ML/MIN/1.7M2
GLUCOSE SERPL-MCNC: 88 MG/DL (ref 70–99)
HDLC SERPL-MCNC: 50 MG/DL
LDLC SERPL CALC-MCNC: 71 MG/DL
NONHDLC SERPL-MCNC: 90 MG/DL
POTASSIUM SERPL-SCNC: 4 MMOL/L (ref 3.4–5.3)
PROT SERPL-MCNC: 7.3 G/DL (ref 6.8–8.8)
SODIUM SERPL-SCNC: 137 MMOL/L (ref 133–144)
TRIGL SERPL-MCNC: 93 MG/DL

## 2017-12-18 ENCOUNTER — TELEPHONE (OUTPATIENT)
Dept: FAMILY MEDICINE | Facility: CLINIC | Age: 52
End: 2017-12-18

## 2017-12-18 LAB — DEPRECATED CALCIDIOL+CALCIFEROL SERPL-MC: 48 UG/L (ref 20–75)

## 2017-12-18 NOTE — TELEPHONE ENCOUNTER
Called and LM on VM to call clinic back to complete PHQ9. Miss at last visit.      Jelena Ochoa MA

## 2017-12-19 LAB
SHBG SERPL-SCNC: 31 NMOL/L (ref 11–80)
TESTOST FREE SERPL-MCNC: 11.66 NG/DL (ref 4.7–24.4)
TESTOST SERPL-MCNC: 531 NG/DL (ref 240–950)

## 2017-12-19 ASSESSMENT — PATIENT HEALTH QUESTIONNAIRE - PHQ9: SUM OF ALL RESPONSES TO PHQ QUESTIONS 1-9: 1

## 2017-12-26 RX ORDER — CHLORTHALIDONE 25 MG/1
TABLET ORAL
Qty: 45 TABLET | Refills: 3 | Status: SHIPPED | OUTPATIENT
Start: 2017-12-26

## 2017-12-26 RX ORDER — ESCITALOPRAM OXALATE 20 MG/1
20 TABLET ORAL DAILY
Qty: 90 TABLET | Refills: 3 | Status: SHIPPED | OUTPATIENT
Start: 2017-12-26

## 2017-12-26 RX ORDER — TRAZODONE HYDROCHLORIDE 50 MG/1
TABLET, FILM COATED ORAL
Qty: 90 TABLET | Refills: 3 | Status: SHIPPED | OUTPATIENT
Start: 2017-12-26

## 2017-12-26 RX ORDER — VARDENAFIL HYDROCHLORIDE 20 MG/1
10-20 TABLET ORAL DAILY PRN
Qty: 12 TABLET | Refills: 11 | Status: SHIPPED | OUTPATIENT
Start: 2017-12-26

## 2017-12-26 RX ORDER — ATORVASTATIN CALCIUM 40 MG/1
40 TABLET, FILM COATED ORAL DAILY
Qty: 90 TABLET | Refills: 1 | Status: SHIPPED | OUTPATIENT
Start: 2017-12-26 | End: 2018-06-26

## 2018-05-24 ENCOUNTER — TELEPHONE (OUTPATIENT)
Dept: LAB | Facility: CLINIC | Age: 53
End: 2018-05-24

## 2018-05-24 DIAGNOSIS — Z12.5 SCREENING FOR PROSTATE CANCER: ICD-10-CM

## 2018-05-24 DIAGNOSIS — E34.9 HYPOTESTOSTERONISM: Primary | ICD-10-CM

## 2018-05-24 NOTE — TELEPHONE ENCOUNTER
Patient has lab only appt 6/15/18, (possibly for Testosterone?), no orders in chart.  Please place FUTURE orders in Epic if appropriate.    Thanks, lab

## 2018-06-26 DIAGNOSIS — E78.5 HYPERLIPIDEMIA LDL GOAL <130: ICD-10-CM

## 2018-06-26 RX ORDER — ATORVASTATIN CALCIUM 40 MG/1
TABLET, FILM COATED ORAL
Qty: 90 TABLET | Refills: 1 | Status: SHIPPED | OUTPATIENT
Start: 2018-06-26

## 2019-03-07 ENCOUNTER — SURGERY - HEALTHEAST (OUTPATIENT)
Dept: SURGERY | Facility: HOSPITAL | Age: 54
End: 2019-03-07

## 2019-03-07 ENCOUNTER — SURGERY - HEALTHEAST (OUTPATIENT)
Dept: SURGERY | Facility: CLINIC | Age: 54
End: 2019-03-07

## 2019-03-07 ENCOUNTER — ANESTHESIA - HEALTHEAST (OUTPATIENT)
Dept: SURGERY | Facility: HOSPITAL | Age: 54
End: 2019-03-07

## 2019-03-07 ASSESSMENT — MIFFLIN-ST. JEOR
SCORE: 1972.05
SCORE: 1931.22
SCORE: 1972.05

## 2021-05-29 ENCOUNTER — RECORDS - HEALTHEAST (OUTPATIENT)
Dept: ADMINISTRATIVE | Facility: CLINIC | Age: 56
End: 2021-05-29

## 2021-06-02 VITALS — BODY MASS INDEX: 27.98 KG/M2 | HEIGHT: 75 IN | WEIGHT: 225 LBS

## 2021-06-24 NOTE — ANESTHESIA PREPROCEDURE EVALUATION
Anesthesia Evaluation      Patient summary reviewed   History of anesthetic complications (slow wake (vs complications of AMANDEEP))     Airway   Mallampati: II   Pulmonary - normal exam   (+) sleep apnea, a smoker                         Cardiovascular   Exercise tolerance: > or = 4 METS  (-) past MI  Rhythm: regular  Rate: normal,         Neuro/Psych      Endo/Other    (+) obesity,   (-) no diabetes     GI/Hepatic/Renal      Comments: Appendicitis            Dental    (+) poor dentition                       Anesthesia Plan  Planned anesthetic: general endotracheal    ASA 2 - emergent   Induction: intravenous   Anesthetic plan and risks discussed with: patient  Anesthesia plan special considerations: rapid sequence induction, antiemetics,   Post-op plan: routine recovery

## 2021-06-24 NOTE — ANESTHESIA POSTPROCEDURE EVALUATION
Patient: Luis ESTEVES Demulling  APPENDECTOMY, LAPAROSCOPIC  Anesthesia type: general    Patient location: PACU  Last vitals:   Vitals:    03/07/19 2037   BP: 116/65   Pulse: 66   Resp: 17   Temp: 36.7  C (98  F)   SpO2: 93%     Post vital signs: stable  Level of consciousness: awake and responds to simple questions  Post-anesthesia pain: pain controlled  Post-anesthesia nausea and vomiting: no  Pulmonary: unassisted, return to baseline  Cardiovascular: stable and blood pressure at baseline  Hydration: adequate  Anesthetic events: no    QCDR Measures:  ASA# 11 - Delfina-op Cardiac Arrest: ASA11B - Patient did NOT experience unanticipated cardiac arrest  ASA# 12 - Delfina-op Mortality Rate: ASA12B - Patient did NOT die  ASA# 13 - PACU Re-Intubation Rate: ASA13B - Patient did NOT require a new airway mgmt  ASA# 10 - Composite Anes Safety: ASA10A - No serious adverse event    Additional Notes:

## 2021-06-24 NOTE — ANESTHESIA CARE TRANSFER NOTE
Last vitals:   Vitals:    03/07/19 1625   BP: 146/76   Pulse: 86   Resp: 12   Temp: 37  C (98.6  F)   SpO2: 100%     Patient's level of consciousness is drowsy  Spontaneous respirations: yes  Maintains airway independently: yes  Dentition unchanged: yes  Oropharynx: oropharynx clear of all foreign objects    QCDR Measures:  ASA# 20 - Surgical Safety Checklist: WHO surgical safety checklist completed prior to induction    PQRS# 430 - Adult PONV Prevention: 4558F - Pt received => 2 anti-emetic agents (different classes) preop & intraop  ASA# 8 - Peds PONV Prevention: NA - Not pediatric patient, not GA or 2 or more risk factors NOT present  PQRS# 424 - Delfina-op Temp Management: 4559F - At least one body temp DOCUMENTED => 35.5C or 95.9F within required timeframe  PQRS# 426 - PACU Transfer Protocol: - Transfer of care checklist used  ASA# 14 - Acute Post-op Pain: ASA14B - Patient did NOT experience pain >= 7 out of 10

## 2024-02-02 ENCOUNTER — APPOINTMENT (OUTPATIENT)
Dept: CT IMAGING | Facility: CLINIC | Age: 59
End: 2024-02-02
Attending: EMERGENCY MEDICINE
Payer: COMMERCIAL

## 2024-02-02 ENCOUNTER — HOSPITAL ENCOUNTER (EMERGENCY)
Facility: CLINIC | Age: 59
Discharge: HOME | End: 2024-02-02
Attending: EMERGENCY MEDICINE | Admitting: EMERGENCY MEDICINE
Payer: COMMERCIAL

## 2024-02-02 VITALS
WEIGHT: 217 LBS | DIASTOLIC BLOOD PRESSURE: 81 MMHG | SYSTOLIC BLOOD PRESSURE: 155 MMHG | HEART RATE: 61 BPM | TEMPERATURE: 98.3 F | RESPIRATION RATE: 18 BRPM | HEIGHT: 74 IN | OXYGEN SATURATION: 95 % | BODY MASS INDEX: 27.85 KG/M2

## 2024-02-02 DIAGNOSIS — N40.0 ENLARGED PROSTATE: ICD-10-CM

## 2024-02-02 DIAGNOSIS — R11.2 NAUSEA AND VOMITING, UNSPECIFIED VOMITING TYPE: ICD-10-CM

## 2024-02-02 DIAGNOSIS — N20.0 KIDNEY STONE: ICD-10-CM

## 2024-02-02 DIAGNOSIS — N20.1 URETERAL STONE: ICD-10-CM

## 2024-02-02 LAB
ALBUMIN SERPL BCG-MCNC: 4.2 G/DL (ref 3.5–5.2)
ALBUMIN UR-MCNC: 20 MG/DL
ALP SERPL-CCNC: 91 U/L (ref 40–150)
ALT SERPL W P-5'-P-CCNC: 17 U/L (ref 0–70)
ANION GAP SERPL CALCULATED.3IONS-SCNC: 15 MMOL/L (ref 7–15)
APPEARANCE UR: CLEAR
AST SERPL W P-5'-P-CCNC: 15 U/L (ref 0–45)
BASOPHILS # BLD AUTO: 0 10E3/UL (ref 0–0.2)
BASOPHILS NFR BLD AUTO: 0 %
BILIRUB DIRECT SERPL-MCNC: 0.28 MG/DL (ref 0–0.3)
BILIRUB SERPL-MCNC: 1 MG/DL
BILIRUB UR QL STRIP: NEGATIVE
BUN SERPL-MCNC: 25.1 MG/DL (ref 8–23)
CALCIUM SERPL-MCNC: 9.3 MG/DL (ref 8.8–10.2)
CHLORIDE SERPL-SCNC: 100 MMOL/L (ref 98–107)
COLOR UR AUTO: YELLOW
CREAT SERPL-MCNC: 1.07 MG/DL (ref 0.67–1.17)
DEPRECATED HCO3 PLAS-SCNC: 25 MMOL/L (ref 22–29)
EGFRCR SERPLBLD CKD-EPI 2021: 79 ML/MIN/1.73M2
EOSINOPHIL # BLD AUTO: 0 10E3/UL (ref 0–0.7)
EOSINOPHIL NFR BLD AUTO: 0 %
ERYTHROCYTE [DISTWIDTH] IN BLOOD BY AUTOMATED COUNT: 12.2 % (ref 10–15)
FLUAV RNA SPEC QL NAA+PROBE: NEGATIVE
FLUBV RNA RESP QL NAA+PROBE: NEGATIVE
GLUCOSE SERPL-MCNC: 122 MG/DL (ref 70–99)
GLUCOSE UR STRIP-MCNC: NEGATIVE MG/DL
HCT VFR BLD AUTO: 44.3 % (ref 40–53)
HGB BLD-MCNC: 15.5 G/DL (ref 13.3–17.7)
HGB UR QL STRIP: ABNORMAL
HOLD SPECIMEN: NORMAL
HYALINE CASTS: 1 /LPF
IMM GRANULOCYTES # BLD: 0 10E3/UL
IMM GRANULOCYTES NFR BLD: 0 %
KETONES UR STRIP-MCNC: 60 MG/DL
LEUKOCYTE ESTERASE UR QL STRIP: NEGATIVE
LIPASE SERPL-CCNC: 15 U/L (ref 13–60)
LYMPHOCYTES # BLD AUTO: 1.3 10E3/UL (ref 0.8–5.3)
LYMPHOCYTES NFR BLD AUTO: 11 %
MAGNESIUM SERPL-MCNC: 2.1 MG/DL (ref 1.7–2.3)
MCH RBC QN AUTO: 31.9 PG (ref 26.5–33)
MCHC RBC AUTO-ENTMCNC: 35 G/DL (ref 31.5–36.5)
MCV RBC AUTO: 91 FL (ref 78–100)
MONOCYTES # BLD AUTO: 0.7 10E3/UL (ref 0–1.3)
MONOCYTES NFR BLD AUTO: 6 %
MUCOUS THREADS #/AREA URNS LPF: PRESENT /LPF
NEUTROPHILS # BLD AUTO: 9.5 10E3/UL (ref 1.6–8.3)
NEUTROPHILS NFR BLD AUTO: 83 %
NITRATE UR QL: NEGATIVE
NRBC # BLD AUTO: 0 10E3/UL
NRBC BLD AUTO-RTO: 0 /100
PH UR STRIP: 6.5 [PH] (ref 5–7)
PLATELET # BLD AUTO: 321 10E3/UL (ref 150–450)
POTASSIUM SERPL-SCNC: 3.4 MMOL/L (ref 3.4–5.3)
PROT SERPL-MCNC: 7.2 G/DL (ref 6.4–8.3)
RBC # BLD AUTO: 4.86 10E6/UL (ref 4.4–5.9)
RBC URINE: 84 /HPF
RSV RNA SPEC NAA+PROBE: NEGATIVE
SARS-COV-2 RNA RESP QL NAA+PROBE: NEGATIVE
SODIUM SERPL-SCNC: 140 MMOL/L (ref 135–145)
SP GR UR STRIP: 1.02 (ref 1–1.03)
UROBILINOGEN UR STRIP-MCNC: <2 MG/DL
WBC # BLD AUTO: 11.6 10E3/UL (ref 4–11)
WBC URINE: 3 /HPF

## 2024-02-02 PROCEDURE — 82040 ASSAY OF SERUM ALBUMIN: CPT | Performed by: EMERGENCY MEDICINE

## 2024-02-02 PROCEDURE — 96361 HYDRATE IV INFUSION ADD-ON: CPT

## 2024-02-02 PROCEDURE — 83690 ASSAY OF LIPASE: CPT | Performed by: EMERGENCY MEDICINE

## 2024-02-02 PROCEDURE — 80053 COMPREHEN METABOLIC PANEL: CPT | Performed by: EMERGENCY MEDICINE

## 2024-02-02 PROCEDURE — 80048 BASIC METABOLIC PNL TOTAL CA: CPT | Performed by: EMERGENCY MEDICINE

## 2024-02-02 PROCEDURE — 83735 ASSAY OF MAGNESIUM: CPT | Performed by: EMERGENCY MEDICINE

## 2024-02-02 PROCEDURE — 250N000011 HC RX IP 250 OP 636: Performed by: EMERGENCY MEDICINE

## 2024-02-02 PROCEDURE — 96374 THER/PROPH/DIAG INJ IV PUSH: CPT | Mod: 59

## 2024-02-02 PROCEDURE — 36415 COLL VENOUS BLD VENIPUNCTURE: CPT | Performed by: EMERGENCY MEDICINE

## 2024-02-02 PROCEDURE — 250N000013 HC RX MED GY IP 250 OP 250 PS 637: Performed by: EMERGENCY MEDICINE

## 2024-02-02 PROCEDURE — 74177 CT ABD & PELVIS W/CONTRAST: CPT

## 2024-02-02 PROCEDURE — 85025 COMPLETE CBC W/AUTO DIFF WBC: CPT | Performed by: EMERGENCY MEDICINE

## 2024-02-02 PROCEDURE — 96375 TX/PRO/DX INJ NEW DRUG ADDON: CPT

## 2024-02-02 PROCEDURE — 258N000003 HC RX IP 258 OP 636: Performed by: EMERGENCY MEDICINE

## 2024-02-02 PROCEDURE — 81001 URINALYSIS AUTO W/SCOPE: CPT | Performed by: EMERGENCY MEDICINE

## 2024-02-02 PROCEDURE — 99285 EMERGENCY DEPT VISIT HI MDM: CPT | Mod: 25

## 2024-02-02 PROCEDURE — 87637 SARSCOV2&INF A&B&RSV AMP PRB: CPT | Performed by: EMERGENCY MEDICINE

## 2024-02-02 RX ORDER — PROMETHAZINE HYDROCHLORIDE 25 MG/1
25 SUPPOSITORY RECTAL EVERY 6 HOURS PRN
Qty: 10 SUPPOSITORY | Refills: 0 | Status: SHIPPED | OUTPATIENT
Start: 2024-02-02

## 2024-02-02 RX ORDER — ONDANSETRON 2 MG/ML
4 INJECTION INTRAMUSCULAR; INTRAVENOUS ONCE
Status: COMPLETED | OUTPATIENT
Start: 2024-02-02 | End: 2024-02-02

## 2024-02-02 RX ORDER — IOPAMIDOL 755 MG/ML
90 INJECTION, SOLUTION INTRAVASCULAR ONCE
Status: COMPLETED | OUTPATIENT
Start: 2024-02-02 | End: 2024-02-02

## 2024-02-02 RX ORDER — ACETAMINOPHEN 500 MG
1000 TABLET ORAL EVERY 6 HOURS
Qty: 56 TABLET | Refills: 0 | Status: SHIPPED | OUTPATIENT
Start: 2024-02-02 | End: 2024-02-09

## 2024-02-02 RX ORDER — METOCLOPRAMIDE HYDROCHLORIDE 5 MG/ML
10 INJECTION INTRAMUSCULAR; INTRAVENOUS ONCE
Status: COMPLETED | OUTPATIENT
Start: 2024-02-02 | End: 2024-02-02

## 2024-02-02 RX ORDER — MORPHINE SULFATE 4 MG/ML
4 INJECTION, SOLUTION INTRAMUSCULAR; INTRAVENOUS ONCE
Status: DISCONTINUED | OUTPATIENT
Start: 2024-02-02 | End: 2024-02-02

## 2024-02-02 RX ORDER — DIMENHYDRINATE 50 MG
50 TABLET ORAL EVERY 6 HOURS PRN
Qty: 28 TABLET | Refills: 0 | Status: SHIPPED | OUTPATIENT
Start: 2024-02-02 | End: 2024-02-09

## 2024-02-02 RX ORDER — OXYCODONE HYDROCHLORIDE 5 MG/1
5 TABLET ORAL EVERY 4 HOURS PRN
Qty: 12 TABLET | Refills: 0 | Status: SHIPPED | OUTPATIENT
Start: 2024-02-02 | End: 2024-02-06

## 2024-02-02 RX ORDER — DIMENHYDRINATE 50 MG
50 TABLET ORAL AT BEDTIME
Qty: 7 TABLET | Refills: 0 | Status: SHIPPED | OUTPATIENT
Start: 2024-02-02 | End: 2024-02-09

## 2024-02-02 RX ORDER — ACETAMINOPHEN 325 MG/1
650 TABLET ORAL ONCE
Status: COMPLETED | OUTPATIENT
Start: 2024-02-02 | End: 2024-02-02

## 2024-02-02 RX ORDER — HYDROMORPHONE HYDROCHLORIDE 1 MG/ML
0.5 INJECTION, SOLUTION INTRAMUSCULAR; INTRAVENOUS; SUBCUTANEOUS ONCE
Status: COMPLETED | OUTPATIENT
Start: 2024-02-02 | End: 2024-02-02

## 2024-02-02 RX ADMIN — HYDROMORPHONE HYDROCHLORIDE 0.5 MG: 1 INJECTION, SOLUTION INTRAMUSCULAR; INTRAVENOUS; SUBCUTANEOUS at 12:05

## 2024-02-02 RX ADMIN — METOCLOPRAMIDE HYDROCHLORIDE 10 MG: 5 INJECTION INTRAMUSCULAR; INTRAVENOUS at 10:34

## 2024-02-02 RX ADMIN — IOPAMIDOL 90 ML: 755 INJECTION, SOLUTION INTRAVENOUS at 10:56

## 2024-02-02 RX ADMIN — SODIUM CHLORIDE 1000 ML: 9 INJECTION, SOLUTION INTRAVENOUS at 10:34

## 2024-02-02 RX ADMIN — ACETAMINOPHEN 650 MG: 325 TABLET ORAL at 12:32

## 2024-02-02 RX ADMIN — SODIUM CHLORIDE 1000 ML: 9 INJECTION, SOLUTION INTRAVENOUS at 09:15

## 2024-02-02 RX ADMIN — ONDANSETRON 4 MG: 2 INJECTION INTRAMUSCULAR; INTRAVENOUS at 09:15

## 2024-02-02 ASSESSMENT — ENCOUNTER SYMPTOMS
VOMITING: 1
ABDOMINAL PAIN: 1
DIARRHEA: 0

## 2024-02-02 ASSESSMENT — ACTIVITIES OF DAILY LIVING (ADL)
ADLS_ACUITY_SCORE: 35
ADLS_ACUITY_SCORE: 33

## 2024-02-02 NOTE — ED PROVIDER NOTES
EMERGENCY DEPARTMENT ENCOUNTER      NAME: Luis Diehl  AGE: 58 year old male  YOB: 1965  MRN: [id number]  EVALUATION DATE & TIME: No admission date for patient encounter.    [facility] [place]    ED PROVIDER: Paulette Katz M.D.      CHIEF COMPLAINT     Chief Complaint   Patient presents with    Vomiting         FINAL IMPRESSION:     1. Ureteral stone    2. Nausea and vomiting, unspecified vomiting type    3. Kidney stone    4. Enlarged prostate          MEDICAL DECISION MAKING:       Pertinent Labs & Imaging studies reviewed. (See chart for details)    58 year old male presents to the Emergency Department for evaluation of vomiting    History  Supplemental history from wife  External Record(s) review as documented below    Exam    Nontoxic looks that he does not feel well dry mucous membranes benign abdominal exam no guarding or rebound  Differential Diagnosis include but not limited to influenza, COVID, dehydration electrolyte abnormalities obstruction among others      Vital Signs: Hypertension  EKG: None  Imaging: I independently interpreted ct abdomen .Formal read by radiologist.  Home meds: reviewed  ED meds: Zofran Reglan  Fluids: Normal saline  Labs:  K 3.4  Cr 1.07  Wbc 11.6  Hgb 15.5  platelets 321    Clinical Impression and Decision Making    58-year-old male presents with wife.  Patient has been having vomiting since Monday.  No hematemesis.  Had 1 bowel movement.  Denies any sick contacts.  Feel chills.  No sore throat runny nose or cough no chest pain or shortness of breath.  Complains also of dysuria noted.  No rashes no new medications.    On exam he is nontoxic looks like he does not feel well has dry mucous membranes benign abdominal exam without guarding or rebound.    IV established patient given 1 L normal saline.  Zofran ordered    Labs COVID influenza RSV are negative.  Normal liver function test.  Lipase normal elevation of the white blood cell  count at 11.6 with a normal hemoglobin.    Patient still nauseated after Zofran.  No vomiting while here.  Will do Reglan and another bolus.  Will image abdomen.  UA no bacteria negative leukocyte esterase NP for red blood cells.  60 ketones we will continue hydration.    Reevaluated states his whole abdomen hurts.  There is no localized tenderness palpation\\    CT abdomen and pelvis revealed 9 x 5 x 5 mm ureteral stone with hydronephrosis.  Interestingly his wife states that he has had kidney stone in the past with vomiting.    Approximately because patient does not have any localization of his pain.    Given Dilaudid Dramamine and Tylenol.    Spoke with kidney stone [place].  They state patient will not be able to pass the stone and will need surgical intervention.  They stated that if patient has pain that is controlled now vomiting and is otherwise afebrile his urine looks well he could be discharged with close follow-up.    Patient and wife state that this is preferred because he cannot be admitted here for insurance purposes.  A prescription was sent to the pharmacy of their choice.  Patient prefers rectal Phenergan instead of Zofran.  Referral to [facility] was done.    Strict discharge instructions regarding increased pain fever vomiting were given.    Patient discharged in stable condition..        In addition to the work out documented, I considered the following work up admission.  Patient and wife states he can go home.  Cannot be admitted at this hospital.  He is currently afebrile.  Will have outpatient procedure.  Patient and wife in agreement      Medical Decision Making    History:  Supplemental history from: Family Member/Significant Other  External Record(s) reviewed: Documented in chart    Work Up:  Chart documentation includes differential considered and any EKGs or imaging independently interpreted by provider, where specified.  In additional to work up documented, I considered the following work  up: Documented in chart, if applicable.    External consultation:  Discussion of management with another provider: Documented in chart, if applicable    Complicating factors:  Care impacted by chronic illness: Hyperlipidemia, Mental Health, and Other: Nephrolithiasis.   Care affected by social determinants of health: N/A    Disposition considerations: Discharge. I prescribed additional prescription strength medication(s) as charted. I considered admission, but discharged the patient after share decision making conversation.      Review of External Records  Hospital/Clinic: [facility].   Date:1/31/24  Colonoscopy.  History of depression    External Consultation  Dr. Felix COLON      ED COURSE   8:52 AM  met with the patient to gather history and to perform my initial exam. We discussed plans for the ED course, including diagnostic testing and treatment. PPE worn: cloth mask  9:16 AM reevaluated and updated  10:34 AM reevaluated   11:22 AM reevaluated and updated  11:46 AM reevaluated and updated  12:15 PM talked to Dr. Castanon from Urology.  12:17 PM reevaluated and updated    At the conclusion of the encounter I discussed the results of all of the tests and the disposition. The questions were answered. The patient and his wife acknowledged understanding and was agreeable with the care plan.         MEDICATIONS GIVEN IN THE EMERGENCY:     Medications   ondansetron (ZOFRAN) injection 4 mg (4 mg Intravenous $Given 2/2/24 0915)   sodium chloride 0.9% BOLUS 1,000 mL (0 mLs Intravenous Stopped 2/2/24 1015)   metoclopramide (REGLAN) injection 10 mg (10 mg Intravenous $Given 2/2/24 1034)   sodium chloride 0.9% BOLUS 1,000 mL (0 mLs Intravenous Stopped 2/2/24 1145)   iopamidol (ISOVUE-370) solution 90 mL (90 mLs Intravenous $Given 2/2/24 1056)   HYDROmorphone (PF) (DILAUDID) injection 0.5 mg (0.5 mg Intravenous $Given 2/2/24 1205)   acetaminophen (TYLENOL) tablet 650 mg (650 mg Oral  $Given 2/2/24 1232)       NEW PRESCRIPTIONS STARTED AT TODAY'S ER VISIT     Discharge Medication List as of 2/2/2024 12:43 PM        START taking these medications    Details   acetaminophen (TYLENOL) 500 MG tablet Take 2 tablets (1,000 mg) by mouth every 6 hours for 7 days, Disp-56 tablet, R-0, E-Prescribe      !! dimenhyDRINATE (DRAMAMINE) 50 MG tablet Take 1 tablet (50 mg) by mouth at bedtime for 7 days, Disp-7 tablet, R-0, E-Prescribe      !! dimenhyDRINATE (DRAMAMINE) 50 MG tablet Take 1 tablet (50 mg) by mouth every 6 hours as needed for other (kidney stone pain management), Disp-28 tablet, R-0, E-Prescribe      oxyCODONE (ROXICODONE) 5 MG tablet Take 1 tablet (5 mg) by mouth every 4 hours as needed for severe pain If pain is not improved with acetaminophen and ibuprofen., Disp-12 tablet, R-0, E-Prescribe      promethazine (PHENERGAN) 25 MG suppository Place 1 suppository (25 mg) rectally every 6 hours as needed for nausea, Disp-10 suppository, R-0, E-Prescribe       !! - Potential duplicate medications found. Please discuss with provider.             =================================================================    HPI     Patient information was obtained from: Patient    Use of : N/A      Luis Diehl is a 58 year old male who presents by walk-in for evaluation of vomiting.     Per wife, the patient has been vomiting since Monday night (1/29/24). He was doing fine on Sunday and  throughout Monday, but after having dinner on Monday night, patient endorses severe vomiting. He has been vomiting since then. Patient took a COVID-test with a negative result. No one else is sick at home. No new medications and patient cold not take his medication due to the vomiting. Patient takes medication for depression, sleeping and cholesterol problem. The patient had his left-hip surgery on December 4th, 2023 which went well. Patient was not scheduled for colonoscopy.     The patient reports feeling  "dehydrated and endorses abdominal discomfort due to the vomiting. He presents here with his wife. Patient states that his stomach is currently \"sore\" and that he \"feels hot\" along with endorsing \"dry mouth and throat\". Patient mentioned having 1 bowel movement yesterday. No recent travels. Denies any leg swelling, rash or any diarrhea right now.  No other complaints at this time.       REVIEW OF SYSTEMS   Review of Systems   Gastrointestinal:  Positive for abdominal pain and vomiting. Negative for diarrhea.   Skin:  Negative for rash.   All other systems reviewed and are negative.       PAST MEDICAL HISTORY:     Past Medical History:   Diagnosis Date    Depressive disorder        PAST SURGICAL HISTORY:     Past Surgical History:   Procedure Laterality Date    [place] LAP,APPENDECTOMY N/A 3/7/2019    Procedure: APPENDECTOMY, LAPAROSCOPIC;  Surgeon: Vladislav Wright MD;  [place] [facility] [place]  Service: General    THORACIC SURGERY      kidney stones four times         CURRENT MEDICATIONS:   acetaminophen (TYLENOL) 500 MG tablet  dimenhyDRINATE (DRAMAMINE) 50 MG tablet  dimenhyDRINATE (DRAMAMINE) 50 MG tablet  oxyCODONE (ROXICODONE) 5 MG tablet  promethazine (PHENERGAN) 25 MG suppository  atorvastatin (LIPITOR) 40 MG tablet  atorvastatin (LIPITOR) 40 MG tablet  chlorthalidone (HYGROTON) 25 MG tablet  Cholecalciferol (VITAMIN D3 PO)  cholecalciferol (VITAMIN D3) 1000 UNIT tablet  escitalopram (LEXAPRO) 20 MG tablet  GLUCOSAMINE SULFATE PO  Omega-3 Fatty Acids (OMEGA-3 FISH OIL PO)  testosterone cypionate (DEPOTESTOTERONE) 200 MG/ML injection  traZODone (DESYREL) 50 MG tablet  UNABLE TO FIND  vardenafil (LEVITRA) 20 MG tablet         ALLERGIES:   No Known Allergies    FAMILY HISTORY:     Family History   Problem Relation Age of Onset    Depression Mother     Diabetes Mother     Hearing Loss Mother     Substance Abuse Father     Depression Father     Hearing Loss Father     Thyroid Disease Brother     Substance Abuse " "Sister     Depression Sister     Thyroid Disease Sister     Seizure Disorder Daughter     Substance Abuse Other     Breast Cancer Other     Other Cancer Other     Diabetes Other        SOCIAL HISTORY:     Social History     Socioeconomic History    Marital status:    Tobacco Use    Smoking status: Some Days     Packs/day: 1.00     Years: 34.00     Additional pack years: 0.00     Total pack years: 34.00     Types: Cigarettes   Substance and Sexual Activity    Alcohol use: Yes     Comment: Alcoholic Drinks/day: drinks screwdrivers daily    Drug use: Yes     Types: Marijuana     Comment: Drug use: uses several times daily    Sexual activity: Yes     Partners: Female     Birth control/protection: Female Surgical   Other Topics Concern    Parent/sibling w/ CABG, MI or angioplasty before 65F 55M? No       VITALS:   BP (!) 155/81   Pulse 61   Temp 98.3  F (36.8  C)   Resp 18   Ht 1.88 m (6' 2\")   Wt 98.4 kg (217 lb)   SpO2 95%   BMI 27.86 kg/m      PHYSICAL EXAM     Physical Exam  Vitals and nursing note reviewed. Exam conducted with a chaperone present.   Constitutional:       General: He is not in acute distress.     Appearance: Normal appearance. He is normal weight. He is not ill-appearing, toxic-appearing or diaphoretic.      Comments: Nontoxic cooperative and pleasant looks like he does not feel well.   Neurological:      Mental Status: He is alert.         Physical Exam   Constitutional: non toxic. Patient does not look well    Head: Atraumatic.     Nose: Nose normal.     Mouth/Throat: Dry mucous membrane.  Poor dentition    Eyes: EOM are normal. Pupils are equal, round, and reactive to light.     Ears: Bilateral pearly white tympanic membranes.    Neck: Normal range of motion. Neck supple.     Cardiovascular: Normal rate, regular rhythm and normal heart sounds.      Pulmonary/Chest: Normal effort  and breath sounds normal.     Abdominal: soft nontender.    Musculoskeletal: Normal range of motion. Left " hip surgical scar without redness.     Neurological: Moves upper and lower extremities equally.    Lymphatics: no edema    : NA    Skin: Skin is warm and dry.     Psychiatric: Normal mood and affect. Behavior is normal.       LAB:     All pertinent labs reviewed and interpreted.  Labs Ordered and Resulted from Time of ED Arrival to Time of ED Departure   BASIC METABOLIC PANEL - Abnormal       Result Value    Sodium 140      Potassium 3.4      Chloride 100      Carbon Dioxide (CO2) 25      Anion Gap 15      Urea Nitrogen 25.1 (*)     Creatinine 1.07      GFR Estimate 79      Calcium 9.3      Glucose 122 (*)    CBC WITH PLATELETS AND DIFFERENTIAL - Abnormal    WBC Count 11.6 (*)     RBC Count 4.86      Hemoglobin 15.5      Hematocrit 44.3      MCV 91      MCH 31.9      MCHC 35.0      RDW 12.2      Platelet Count 321      % Neutrophils 83      % Lymphocytes 11      % Monocytes 6      % Eosinophils 0      % Basophils 0      % Immature Granulocytes 0      NRBCs per 100 WBC 0      Absolute Neutrophils 9.5 (*)     Absolute Lymphocytes 1.3      Absolute Monocytes 0.7      Absolute Eosinophils 0.0      Absolute Basophils 0.0      Absolute Immature Granulocytes 0.0      Absolute NRBCs 0.0     ROUTINE UA WITH MICROSCOPIC REFLEX TO CULTURE - Abnormal    Color Urine Yellow      Appearance Urine Clear      Glucose Urine Negative      Bilirubin Urine Negative      Ketones Urine 60 (*)     Specific Gravity Urine 1.024      Blood Urine 0.2 mg/dL (*)     pH Urine 6.5      Protein Albumin Urine 20 (*)     Urobilinogen Urine <2.0      Nitrite Urine Negative      Leukocyte Esterase Urine Negative      Mucus Urine Present (*)     RBC Urine 84 (*)     WBC Urine 3      Hyaline Casts Urine 1     HEPATIC FUNCTION PANEL - Normal    Protein Total 7.2      Albumin 4.2      Bilirubin Total 1.0      Alkaline Phosphatase 91      AST 15      ALT 17      Bilirubin Direct 0.28     LIPASE - Normal    Lipase 15     MAGNESIUM - Normal    Magnesium 2.1      INFLUENZA A/B, RSV, & SARS-COV2 PCR - Normal    Influenza A PCR Negative      Influenza B PCR Negative      RSV PCR Negative      SARS CoV2 PCR Negative          RADIOLOGY:     Reviewed all pertinent imaging. Please see official radiology report.  CT Abdomen Pelvis w Contrast   Final Result   IMPRESSION:    1.  A 9 x 5 x 5 mm stone at the junction of the middle and distal thirds of the right ureter causing proximal obstruction. Urology consultation recommended.   2.  Two 3 mm nonobstructing right kidney stones and 3 mm and 1 mm nonobstructing left kidney stones.   3.  Prostate enlargement.           EKG:       I have independently reviewed and interpreted the EKG(s) documented above.      PROCEDURES:     Procedures      I, Hetal Fong, am serving as a scribe to document services personally performed by Dr. Katz based on my observation and the provider's statements to me. I, Paulette Katz MD attest that Hetal Fong is acting in a scribe capacity, has observed my performance of the services and has documented them in accordance with my direction.    Paulette Katz M.D.  Emergency Medicine  [facility] EMERGENCY ROOM  [address] [id number] [phone]  Dept: [phone]       Paulette Katz MD  02/02/24 [id number]

## 2024-02-02 NOTE — DISCHARGE INSTRUCTIONS
Read and follow the discharge instructions.    Your COVID influenza were negative    You do have a 9 mm kidney stone that you are not going to be able to pass.    I am doing a referral to the kidney stone specialist they should be giving you a call.    In the meanwhile take the medications as instructed for pain.    Try to stay well-hydrated.    Return to the nearest emergency department if you unable to tolerate a pain unable to urinate or any other concerns.

## 2024-02-02 NOTE — ED TRIAGE NOTES
Pt here with vomiting since Monday night. Did at home covid test yesterday and it was negative. Denies blood in emesis. 8/10 abdominal pain, emesis came first. Has had appendectomy.

## 2024-02-05 ENCOUNTER — VIRTUAL VISIT (OUTPATIENT)
Dept: UROLOGY | Facility: CLINIC | Age: 59
End: 2024-02-05
Payer: COMMERCIAL

## 2024-02-05 ENCOUNTER — TELEPHONE (OUTPATIENT)
Dept: UROLOGY | Facility: CLINIC | Age: 59
End: 2024-02-05

## 2024-02-05 DIAGNOSIS — R11.2 NAUSEA AND VOMITING, UNSPECIFIED VOMITING TYPE: ICD-10-CM

## 2024-02-05 DIAGNOSIS — N20.1 URETERAL STONE: ICD-10-CM

## 2024-02-05 PROCEDURE — 99207 PR NO BILLABLE SERVICE THIS VISIT: CPT | Mod: 95 | Performed by: UROLOGY

## 2024-02-05 NOTE — TELEPHONE ENCOUNTER
Message left for patient on Friday 2/2 and also today regarding verification of appointment scheduled at 3pm today.  This is virtual, to discuss ureteral stone.  Jeanna Gross RN    Per Care Everywhere, patient did have a stent placed at [facility].  Jeanna Gross RN